# Patient Record
Sex: FEMALE | Race: WHITE | HISPANIC OR LATINO | URBAN - METROPOLITAN AREA
[De-identification: names, ages, dates, MRNs, and addresses within clinical notes are randomized per-mention and may not be internally consistent; named-entity substitution may affect disease eponyms.]

---

## 2017-10-21 ENCOUNTER — EMERGENCY (EMERGENCY)
Facility: HOSPITAL | Age: 22
LOS: 1 days | Discharge: HOME | End: 2017-10-21
Attending: EMERGENCY MEDICINE
Payer: MEDICAID

## 2017-10-21 VITALS
HEART RATE: 83 BPM | TEMPERATURE: 98 F | WEIGHT: 293 LBS | OXYGEN SATURATION: 100 % | SYSTOLIC BLOOD PRESSURE: 119 MMHG | RESPIRATION RATE: 18 BRPM | HEIGHT: 63 IN | DIASTOLIC BLOOD PRESSURE: 84 MMHG

## 2017-10-21 DIAGNOSIS — J40 BRONCHITIS, NOT SPECIFIED AS ACUTE OR CHRONIC: ICD-10-CM

## 2017-10-21 DIAGNOSIS — Z88.0 ALLERGY STATUS TO PENICILLIN: ICD-10-CM

## 2017-10-21 DIAGNOSIS — Z91.013 ALLERGY TO SEAFOOD: ICD-10-CM

## 2017-10-21 DIAGNOSIS — I10 ESSENTIAL (PRIMARY) HYPERTENSION: ICD-10-CM

## 2017-10-21 PROCEDURE — 99284 EMERGENCY DEPT VISIT MOD MDM: CPT

## 2017-10-21 PROCEDURE — 99283 EMERGENCY DEPT VISIT LOW MDM: CPT | Mod: 25

## 2017-10-21 PROCEDURE — 71020: CPT | Mod: 26

## 2017-10-21 PROCEDURE — 71046 X-RAY EXAM CHEST 2 VIEWS: CPT

## 2017-10-21 NOTE — ED ADULT NURSE NOTE - OBJECTIVE STATEMENT
Patient presents to ED with cough, congestion x 2 weeks. Denies fever, chills. Patient also reports right ear itchiness, dryness. Denies hearing loss.

## 2017-10-21 NOTE — ED PROVIDER NOTE - OBJECTIVE STATEMENT
23 y/o F pt w/ PMHx of HTN presents to the ED c/o cold symptoms. Pt reports after getting the flu shot she began to feel chest congestion and cough. Pt received 400 of levofloxacin from PMD to no relief. Denies fever, chills or any other complaints. Allergies: penicillin 23 y/o F pt w/ PMHx of HTN presents to the ED c/o cold symptoms. Pt reports after getting the flu shot she began to feel chest congestion and cough. Pt finished prescription of levofloxacin by PMD to no relief. Denies fever, chills or any other complaints. Allergies: penicillin

## 2017-11-11 ENCOUNTER — EMERGENCY (EMERGENCY)
Facility: HOSPITAL | Age: 22
LOS: 1 days | Discharge: HOME | End: 2017-11-11
Attending: EMERGENCY MEDICINE
Payer: MEDICAID

## 2017-11-11 VITALS
SYSTOLIC BLOOD PRESSURE: 135 MMHG | DIASTOLIC BLOOD PRESSURE: 85 MMHG | OXYGEN SATURATION: 97 % | HEART RATE: 96 BPM | HEIGHT: 63 IN | TEMPERATURE: 99 F | WEIGHT: 293 LBS | RESPIRATION RATE: 20 BRPM

## 2017-11-11 DIAGNOSIS — I10 ESSENTIAL (PRIMARY) HYPERTENSION: ICD-10-CM

## 2017-11-11 DIAGNOSIS — L02.413 CUTANEOUS ABSCESS OF RIGHT UPPER LIMB: ICD-10-CM

## 2017-11-11 DIAGNOSIS — Z88.0 ALLERGY STATUS TO PENICILLIN: ICD-10-CM

## 2017-11-11 DIAGNOSIS — Z91.013 ALLERGY TO SEAFOOD: ICD-10-CM

## 2017-11-11 PROCEDURE — 99284 EMERGENCY DEPT VISIT MOD MDM: CPT | Mod: 25

## 2017-11-11 RX ORDER — TETANUS TOXOID, REDUCED DIPHTHERIA TOXOID AND ACELLULAR PERTUSSIS VACCINE, ADSORBED 5; 2.5; 8; 8; 2.5 [IU]/.5ML; [IU]/.5ML; UG/.5ML; UG/.5ML; UG/.5ML
0.5 SUSPENSION INTRAMUSCULAR ONCE
Qty: 0 | Refills: 0 | Status: COMPLETED | OUTPATIENT
Start: 2017-11-11 | End: 2017-11-11

## 2017-11-11 RX ADMIN — TETANUS TOXOID, REDUCED DIPHTHERIA TOXOID AND ACELLULAR PERTUSSIS VACCINE, ADSORBED 0.5 MILLILITER(S): 5; 2.5; 8; 8; 2.5 SUSPENSION INTRAMUSCULAR at 23:32

## 2017-11-11 NOTE — ED PROVIDER NOTE - OBJECTIVE STATEMENT
21 y/o F pt with PMHx of PCOS (on Metformin), HTN (on Lisinopril and Metoprolol), and Irregular Menses and no significant PSHx presents to ED c/o abscess on R forearm with associated redness, warmth, pain, and discharge (yellow pus) for x1 week. Pt reports applying ice, as well as Neosporin and Hydrogen Peroxide to the area with no relief of sx's. Pt denies any other complaints. Pt also denies recent travel, recent hiking, Hx of smoking, EtOH use/abuse, or drug use/abuse. Pt notes FHx of cancer. Allergies: Penicillin (anaphylaxis, vomiting). Pharmacy: [facility]. 21 y/o F pt with PMHx of PCOS (on Metformin), HTN (on Lisinopril and Metoprolol), and Irregular Menses and no significant PSHx presents to ED c/o abscess on R forearm with associated redness, warmth, pain, and discharge (yellow pus) for x1 week. Pt reports applying ice, as well as Neosporin and Hydrogen Peroxide to the area with no relief of sx's. Pt denies any other complaints. Pt also denies fever, recent travel, recent hiking, Hx of smoking, EtOH use/abuse, or drug use/abuse. Pt notes FHx of cancer. Allergies: Penicillin (anaphylaxis, vomiting). Pharmacy: [facility].

## 2017-11-11 NOTE — ED PROVIDER NOTE - MEDICAL DECISION MAKING DETAILS
Diff dx includes abscess, cellulitis, Lyme disease. H/o drainage as well as mild induration c/w drained abscess. Bedside u/s showed no remaining anechoic fluid collection. Its heterogenous appearance that recalls erythema migrans is c/f Lyme disease as well, though no apparent exposure by history. Patient has no other systemic symptoms that would suggest disseminated Lyme. In summary, most likely drained abscess, but treated with 2 wks doxycycline due to possibility of Lyme. Discharged with instructions  for f/u.

## 2017-11-11 NOTE — ED PROVIDER NOTE - PHYSICAL EXAMINATION
Afebrile, hemodynamically stable  NAD, well appearing  Head NCAT  EOMI grossly  MMM  Breathing comfortably on RA  Obese  AAO, CN's 3-12 grossly intact  DONOHUE spontaneously  Skin warm, well perfused. Noted approximately 10x7cm erythematous patch with smaller concentric 5x5cm more erythematous patch with central superificial ulceration. No induration or fluctuance, no evident drainage

## 2017-11-12 VITALS
HEART RATE: 88 BPM | SYSTOLIC BLOOD PRESSURE: 130 MMHG | RESPIRATION RATE: 18 BRPM | TEMPERATURE: 98 F | DIASTOLIC BLOOD PRESSURE: 74 MMHG | OXYGEN SATURATION: 98 %

## 2017-11-12 PROCEDURE — 90471 IMMUNIZATION ADMIN: CPT

## 2017-11-12 PROCEDURE — 90715 TDAP VACCINE 7 YRS/> IM: CPT

## 2017-11-12 PROCEDURE — 86703 HIV-1/HIV-2 1 RESULT ANTBDY: CPT

## 2017-11-12 PROCEDURE — 99283 EMERGENCY DEPT VISIT LOW MDM: CPT | Mod: 25

## 2017-11-12 PROCEDURE — 87389 HIV-1 AG W/HIV-1&-2 AB AG IA: CPT

## 2017-11-12 RX ADMIN — Medication 100 MILLIGRAM(S): at 00:09

## 2017-12-09 ENCOUNTER — INPATIENT (INPATIENT)
Facility: HOSPITAL | Age: 22
LOS: 2 days | Discharge: HOME | DRG: 603 | End: 2017-12-12
Attending: INTERNAL MEDICINE | Admitting: INTERNAL MEDICINE
Payer: MEDICAID

## 2017-12-09 VITALS
DIASTOLIC BLOOD PRESSURE: 64 MMHG | TEMPERATURE: 99 F | OXYGEN SATURATION: 97 % | RESPIRATION RATE: 19 BRPM | HEART RATE: 110 BPM | WEIGHT: 293 LBS | SYSTOLIC BLOOD PRESSURE: 122 MMHG | HEIGHT: 63 IN

## 2017-12-09 DIAGNOSIS — E28.2 POLYCYSTIC OVARIAN SYNDROME: ICD-10-CM

## 2017-12-09 DIAGNOSIS — L03.211 CELLULITIS OF FACE: ICD-10-CM

## 2017-12-09 DIAGNOSIS — L03.90 CELLULITIS, UNSPECIFIED: ICD-10-CM

## 2017-12-09 DIAGNOSIS — I10 ESSENTIAL (PRIMARY) HYPERTENSION: ICD-10-CM

## 2017-12-09 DIAGNOSIS — E66.9 OBESITY, UNSPECIFIED: ICD-10-CM

## 2017-12-09 DIAGNOSIS — Z29.9 ENCOUNTER FOR PROPHYLACTIC MEASURES, UNSPECIFIED: ICD-10-CM

## 2017-12-09 LAB
ALBUMIN SERPL ELPH-MCNC: 3.1 G/DL — LOW (ref 3.5–5)
ALP SERPL-CCNC: 102 U/L — SIGNIFICANT CHANGE UP (ref 40–120)
ALT FLD-CCNC: 41 U/L DA — SIGNIFICANT CHANGE UP (ref 10–60)
ANION GAP SERPL CALC-SCNC: 8 MMOL/L — SIGNIFICANT CHANGE UP (ref 5–17)
AST SERPL-CCNC: 20 U/L — SIGNIFICANT CHANGE UP (ref 10–40)
BILIRUB SERPL-MCNC: 0.6 MG/DL — SIGNIFICANT CHANGE UP (ref 0.2–1.2)
BUN SERPL-MCNC: 8 MG/DL — SIGNIFICANT CHANGE UP (ref 7–18)
CALCIUM SERPL-MCNC: 8.9 MG/DL — SIGNIFICANT CHANGE UP (ref 8.4–10.5)
CHLORIDE SERPL-SCNC: 102 MMOL/L — SIGNIFICANT CHANGE UP (ref 96–108)
CO2 SERPL-SCNC: 27 MMOL/L — SIGNIFICANT CHANGE UP (ref 22–31)
CREAT SERPL-MCNC: 0.7 MG/DL — SIGNIFICANT CHANGE UP (ref 0.5–1.3)
GLUCOSE SERPL-MCNC: 111 MG/DL — HIGH (ref 70–99)
HCT VFR BLD CALC: 41.8 % — SIGNIFICANT CHANGE UP (ref 34.5–45)
HGB BLD-MCNC: 14 G/DL — SIGNIFICANT CHANGE UP (ref 11.5–15.5)
LACTATE SERPL-SCNC: 1.6 MMOL/L — SIGNIFICANT CHANGE UP (ref 0.7–2)
MCHC RBC-ENTMCNC: 29.6 PG — SIGNIFICANT CHANGE UP (ref 27–34)
MCHC RBC-ENTMCNC: 33.6 GM/DL — SIGNIFICANT CHANGE UP (ref 32–36)
MCV RBC AUTO: 88.2 FL — SIGNIFICANT CHANGE UP (ref 80–100)
PLATELET # BLD AUTO: 240 K/UL — SIGNIFICANT CHANGE UP (ref 150–400)
POTASSIUM SERPL-MCNC: 3.9 MMOL/L — SIGNIFICANT CHANGE UP (ref 3.5–5.3)
POTASSIUM SERPL-SCNC: 3.9 MMOL/L — SIGNIFICANT CHANGE UP (ref 3.5–5.3)
PROT SERPL-MCNC: 8.5 G/DL — HIGH (ref 6–8.3)
RBC # BLD: 4.74 M/UL — SIGNIFICANT CHANGE UP (ref 3.8–5.2)
RBC # FLD: 12 % — SIGNIFICANT CHANGE UP (ref 10.3–14.5)
SODIUM SERPL-SCNC: 137 MMOL/L — SIGNIFICANT CHANGE UP (ref 135–145)
WBC # BLD: 13.9 K/UL — HIGH (ref 3.8–10.5)
WBC # FLD AUTO: 13.9 K/UL — HIGH (ref 3.8–10.5)

## 2017-12-09 PROCEDURE — 99284 EMERGENCY DEPT VISIT MOD MDM: CPT

## 2017-12-09 PROCEDURE — 70491 CT SOFT TISSUE NECK W/DYE: CPT | Mod: 26

## 2017-12-09 RX ORDER — LISINOPRIL 2.5 MG/1
10 TABLET ORAL DAILY
Qty: 0 | Refills: 0 | Status: DISCONTINUED | OUTPATIENT
Start: 2017-12-09 | End: 2017-12-12

## 2017-12-09 RX ORDER — LACTOBACILLUS ACIDOPHILUS 100MM CELL
1 CAPSULE ORAL
Qty: 0 | Refills: 0 | Status: DISCONTINUED | OUTPATIENT
Start: 2017-12-09 | End: 2017-12-12

## 2017-12-09 RX ORDER — METFORMIN HYDROCHLORIDE 850 MG/1
500 TABLET ORAL DAILY
Qty: 0 | Refills: 0 | Status: DISCONTINUED | OUTPATIENT
Start: 2017-12-09 | End: 2017-12-12

## 2017-12-09 RX ORDER — IBUPROFEN 200 MG
400 TABLET ORAL EVERY 6 HOURS
Qty: 0 | Refills: 0 | Status: DISCONTINUED | OUTPATIENT
Start: 2017-12-09 | End: 2017-12-12

## 2017-12-09 RX ORDER — KETOROLAC TROMETHAMINE 30 MG/ML
30 SYRINGE (ML) INJECTION ONCE
Qty: 0 | Refills: 0 | Status: DISCONTINUED | OUTPATIENT
Start: 2017-12-09 | End: 2017-12-09

## 2017-12-09 RX ORDER — MEDROXYPROGESTERONE ACETATE 150 MG/ML
10 INJECTION, SUSPENSION, EXTENDED RELEASE INTRAMUSCULAR DAILY
Qty: 0 | Refills: 0 | Status: DISCONTINUED | OUTPATIENT
Start: 2017-12-09 | End: 2017-12-12

## 2017-12-09 RX ORDER — METOPROLOL TARTRATE 50 MG
50 TABLET ORAL DAILY
Qty: 0 | Refills: 0 | Status: DISCONTINUED | OUTPATIENT
Start: 2017-12-09 | End: 2017-12-12

## 2017-12-09 RX ORDER — VANCOMYCIN HCL 1 G
1000 VIAL (EA) INTRAVENOUS EVERY 12 HOURS
Qty: 0 | Refills: 0 | Status: DISCONTINUED | OUTPATIENT
Start: 2017-12-09 | End: 2017-12-11

## 2017-12-09 RX ADMIN — METFORMIN HYDROCHLORIDE 500 MILLIGRAM(S): 850 TABLET ORAL at 21:57

## 2017-12-09 RX ADMIN — Medication 1 TABLET(S): at 18:36

## 2017-12-09 RX ADMIN — Medication 250 MILLIGRAM(S): at 18:35

## 2017-12-09 RX ADMIN — MEDROXYPROGESTERONE ACETATE 10 MILLIGRAM(S): 150 INJECTION, SUSPENSION, EXTENDED RELEASE INTRAMUSCULAR at 18:36

## 2017-12-09 RX ADMIN — LISINOPRIL 10 MILLIGRAM(S): 2.5 TABLET ORAL at 21:58

## 2017-12-09 RX ADMIN — Medication 100 MILLIGRAM(S): at 12:07

## 2017-12-09 RX ADMIN — Medication 50 MILLIGRAM(S): at 18:36

## 2017-12-09 RX ADMIN — Medication 100 MILLIGRAM(S): at 21:57

## 2017-12-09 RX ADMIN — Medication 30 MILLIGRAM(S): at 12:30

## 2017-12-09 RX ADMIN — Medication 30 MILLIGRAM(S): at 12:06

## 2017-12-09 NOTE — H&P ADULT - MUSCULOSKELETAL
detailed exam no joint warmth/normal strength/no joint swelling/no joint erythema/normal/ROM intact/no calf tenderness

## 2017-12-09 NOTE — H&P ADULT - NSHPLABSRESULTS_GEN_ALL_CORE
14.0   13.9  )-----------( 240      ( 09 Dec 2017 11:55 )             41.8   12-09    137  |  102  |  8   ----------------------------<  111<H>  3.9   |  27  |  0.70    Ca    8.9      09 Dec 2017 11:55    TPro  8.5<H>  /  Alb  3.1<L>  /  TBili  0.6  /  DBili  x   /  AST  20  /  ALT  41  /  AlkPhos  102  12-09  < from: CT Neck Soft Tissue w/ IV Cont (12.09.17 @ 12:04) >      Vital Signs Last 24 Hrs  T(C): 36.8 (09 Dec 2017 13:47), Max: 37.3 (09 Dec 2017 10:28)  T(F): 98.3 (09 Dec 2017 13:47), Max: 99.2 (09 Dec 2017 10:28)  HR: 103 (09 Dec 2017 13:47) (103 - 110)  BP: 114/69 (09 Dec 2017 13:47) (114/69 - 122/64)  BP(mean): --  RR: 18 (09 Dec 2017 13:47) (18 - 19)  SpO2: 100% (09 Dec 2017 13:47) (97% - 100%)

## 2017-12-09 NOTE — CONSULT NOTE ADULT - ASSESSMENT
23 y/o F, morbidly obese, PMHx of HTN, obesity, PCOS, vitamin D deficiency,  presents to ED c/o right facial pain. Patient reports she had a pimple for 10 days which she squeezed on her bottom right cheek.  Patient was admitted for facial cellulitis, and was started on IV Clindamycin.  Vancomycin was added.

## 2017-12-09 NOTE — H&P ADULT - HISTORY OF PRESENT ILLNESS
21 y/o F, morbidly obese, PMHx of HTN, obesity, PCOS, vitamin D deficiency,  presents to ED c/o right facial pain. Patient reports she had a pimple for 10 days which she squeezed on her bottom right cheek. Patient states since squeezing it, the pimple appears to be more swollen and red. Patient denies fever, chills, nausea, vomiting, diarrhea, abd pain, discharge, recent travel or any other complaints.   Allergies: penicillin: Drug, Vomiting, shellfish: Food, Swelling (Mild)  FhX: Hx of HTN, DM, lupus, thyroid in father, from mother side has colon CA, breast CA.  SHx: No surgeries in past  In the ED initial vitals with mild tachycardiac, afebrile, labs with mild leukocytosis, to 13.9, got CT neck with soft tissue that shows right facial cellulitis got clindamycin, Toradol ED physician spoke to the plastic surgeon, no fluid collection see,  admitted for IV antibiotics

## 2017-12-09 NOTE — H&P ADULT - ATTENDING COMMENTS
Above  noted  21 y/o F, morbidly obese, PMHx of HTN, obesity, PCOS, vitamin D deficiency,  presents to ED c/o right facial pain. Patient reports she had a pimple for 10 days which she squeezed on her bottom right cheek. Patient states since squeezing it, the pimple appears to be more swollen and red.  Blood  tests radiology report reviewed    Impression Cellulitis  face  Sepsis syndrome    HTN Morbid Obesity   PCOS Vitamin D deficiency   ID evaluation Septic work up agree with IV antibiotics  GI DVT prophylaxis  Nutritional support,Patient  is schedule  for bariatric  surgery  in the  near future

## 2017-12-09 NOTE — CONSULT NOTE ADULT - SUBJECTIVE AND OBJECTIVE BOX
HPI:  21 y/o F, morbidly obese, PMHx of HTN, obesity, PCOS, vitamin D deficiency,  presents to ED c/o right facial pain. Patient reports she had a pimple for 10 days which she squeezed on her bottom right cheek. Patient states since squeezing it, the pimple appears to be more swollen and red. Patient denies fever, chills, nausea, vomiting, diarrhea, abd pain, discharge, recent travel or any other complaints.   Allergies: penicillin: Drug, Vomiting, shellfish: Food, Swelling (Mild)  FhX: Hx of HTN, DM, lupus, thyroid in father, from mother side has colon CA, breast CA.  SHx: No surgeries in past  In the ED initial vitals with mild tachycardiac, afebrile, labs with mild leukocytosis, to 13.9, got CT neck with soft tissue that shows right facial cellulitis got clindamycin, Toradol ED physician spoke to the plastic surgeon, no fluid collection see,  admitted for IV antibiotics (09 Dec 2017 13:42)      PAST MEDICAL & SURGICAL HISTORY:  PCOS (polycystic ovarian syndrome)  Irregular menses  HTN (hypertension)  No significant past surgical history      penicillin (Vomiting)  shellfish (Swelling (Mild))      clindamycin IVPB 300 milliGRAM(s) IV Intermittent every 8 hours  ibuprofen  Tablet 400 milliGRAM(s) Oral every 6 hours PRN  lactobacillus acidophilus 1 Tablet(s) Oral three times a day with meals  lisinopril 10 milliGRAM(s) Oral daily  medroxyPROGESTERone 10 milliGRAM(s) Oral daily  metFORMIN 500 milliGRAM(s) Oral daily  metoprolol     tartrate 50 milliGRAM(s) Oral daily  vancomycin  IVPB 1000 milliGRAM(s) IV Intermittent every 12 hours      Social Hx:    FAMILY HISTORY:        ROS  [  ] UNABLE TO ELICIT    General:  [  ] None  [  ] Fever  [  ] Chills  [ x ] Malaise    Skin:  [  ] None [  ] Rash  [  ] Wound  [ x ] Right side facial redness and swelling.    HEENT:  [  ] None  [  ] Sore Throat  [  ] Nasal congestion/ runny nose  [  ] Photophobia [  ] Neck pain                 [ x ] Right side facial redness and swelling.    Chest:  [ x ] None   [  ] SOB  [  ] Cough  [  ] None    Cardiovascular:   [ x ] None  [  ] CP  [  ] Palpitation    Gastrointestinal:  [ x ] None  [  ] Abd pain   [  ] Nausea    [  ] Vomiting   [  ] Diarrhea	     Genitourinary:  [ x ] None [  ] Polyuria   [  ] Urgency  [  ] Frequency  [  ] Dysuria    [  ]  Hematuria       Musculoskeletal:  [ x ] None [  ] Back Pain	[  ] Body aches  [  ] Joint pain    Neurological: [ x ] None [  ]Dizziness  [  ]Visual Disturbance  [  ]Headaches   [  ] Weakness      PHYSICAL EXAM:    Vital Signs Last 24 Hrs  T(C): 36.7 (09 Dec 2017 15:45), Max: 37.3 (09 Dec 2017 10:28)  T(F): 98 (09 Dec 2017 15:45), Max: 99.2 (09 Dec 2017 10:28)  HR: 111 (09 Dec 2017 15:45) (103 - 111)  BP: 147/76 (09 Dec 2017 15:45) (114/69 - 147/76)  BP(mean): --  RR: 17 (09 Dec 2017 15:45) (17 - 19)  SpO2: 100% (09 Dec 2017 15:45) (97% - 100%)    Constitutional:    HEENT: [  ] Wnl  [  ] Pharyngeal congestion [ x ] Right side facial, neck redness, swelling, and induration.    Neck:  [ x ] Supple  [  ]Lymphadenopathy  [ x ] No JVD   [  ] JVD  [  ] Masses   [  ] WNL    CHEST/Respiratory:  [ x ]Clear to auscultation  [  ] Rales   [  ] Rhonchi   [  ] Wheezing     [  ] Chest Tenderness      Cardiovascular:  [ x ] Reg S1 S2   [  ] Irreg S1 S2   [  ]No Murmur  [  ] +ve Murmurs  [  ]Systolic [  ]Diastolic      Abdomen:  [ x ] Soft  [ x ] No tendrerness  [  ] Tenderness  [  ] Organomegaly  [  ] ABD Distention  [  ] Rigidity                       [ x ] No Regidity                       [ x ] No Rebound Tenderness  [  ] No Guarding Rigidity  [  ] Rebound Tenderness[  ] Guarding Rigidity                          [ x ]  +ve Bowel Sounds  [  ] Decreased Bowel Sounds    [  ] Absent Bowel Sounds                            Extremities: [ x ] No edema [  ] Edema  [  ] Clubbing   [  ] Cyanosis                         [ x ] No Tender Calf muscles  [  ] Tender Calf muscles                        [ x ] Palpable peripheral pulses    Neurological: [ x ] Awake  [ x ] Alert  [ x ] Oriented  x  3                           [  ] Confused  [  ] Drowzy  [  ] repond to painful stimuli  [  ] Unresponsive    Skin:  [  ] Intact [  ] Redness [  ] Thrombophlebitis  [  ] Rashes  [  ] Dry  [  ] Ulcers          [ x ] Right side facial,  and neck redness and swelling.    Ortho:  [  ] Joint Swelling  [  ] Joint erythema [  ] Joint tenderness                [  ] Increased temp. to touch  [  ] DJD [ x ] WNL      LABS/DIAGNOSTIC TESTS                          14.0   13.9  )-----------( 240      ( 09 Dec 2017 11:55 )             41.8     WBC Count: 13.9 K/uL (12-09 @ 11:55)      12-09    137  |  102  |  8   ----------------------------<  111<H>  3.9   |  27  |  0.70    Ca    8.9      09 Dec 2017 11:55    TPro  8.5<H>  /  Alb  3.1<L>  /  TBili  0.6  /  DBili  x   /  AST  20  /  ALT  41  /  AlkPhos  102  12-09          LIVER FUNCTIONS - ( 09 Dec 2017 11:55 )  Alb: 3.1 g/dL / Pro: 8.5 g/dL / ALK PHOS: 102 U/L / ALT: 41 U/L DA / AST: 20 U/L / GGT: x                 LACTATE:Lactate, Blood: 1.6 mmol/L (12-09 @ 11:55)        CULTURES:   Pending.    RADIOLOGY      EXAM:  CT NECK SOFT TISSUE IC                            PROCEDURE DATE:  12/09/2017          INTERPRETATION:  CLINICAL INDICATION:  Neck pain. Evaluate for abscess.    TECHNIQUE:   Axial images were obtained following the intravenous   administration of contrast material. Sagittal and coronal reformatted   images were obtained. 90 cc Omnipaque 350 were administered. 10 cc were   discarded.    COMPARISON EXAMINATION:  None.    FINDINGS:  AERODIGESTIVE TRACT:  Normal.    LYMPH NODES: Subcentimeter lymph nodes are seen in the right   submandibular region and submental region.    PAROTID GLANDS:  Fatty infiltration of both parotid glands.    SUBMANDIBULAR GLANDS:  Normal.    THYROID GLAND:  Normal.    VISUALIZED PARANASAL SINUSES: Mild paranasal sinus mucosal thickening   involving the right maxillary and bilateral sphenoid and ethmoid sinuses.    VISUALIZED TYMPANOMASTOID CAVITIES: Clear.    BONES:  Normal.    MISCELLANEOUS:  There is skin thickening of the right face at the level   of the angle of the right mandible. Subjacent fluid and fat reticulation   is seen. No walled drainable fluid collection is seen. There is reactive   thickening of the right platysma muscle.      IMPRESSION:    Right facial cellulitis. No drainable fluid collection.

## 2017-12-09 NOTE — H&P ADULT - RS GEN PE MLT RESP DETAILS PC
respirations non-labored/airway patent/good air movement/clear to auscultation bilaterally/no rhonchi/normal/breath sounds equal/no chest wall tenderness/no intercostal retractions/no wheezes/no rales/no subcutaneous emphysema

## 2017-12-09 NOTE — H&P ADULT - GASTROINTESTINAL DETAILS
no distention/soft/nontender/no bruit/no rigidity/no organomegaly/no guarding/no masses palpable/bowel sounds normal/normal/no rebound tenderness

## 2017-12-09 NOTE — H&P ADULT - PROBLEM SELECTOR PLAN 1
Pimple on right cheek for 10 days that breaks, associated with mild pain  Afebrile, mild wbc count   CT neck shows right facial cellulitis, with no drainable abscess,  subcentimeter lymph nodes are seen in the right  submandibular region and submental region.  Fatty infiltration of both parotid glands. Skin thickening of the right face at the level of the angle of the right mandible. Subjacent fluid and fat reticulation is seen. There is reactive thickening of the right platysma muscle.  Got clindamycin and Toradol  Will continue with clindamycin for now, pain control  ID consult Dr Palmer Pimple on right cheek for 10 days that breaks, associated with mild pain  Afebrile, mild wbc count , normal lactate   CT neck shows right facial cellulitis, with no drainable abscess,  subcentimeter lymph nodes are seen in the right  submandibular region and submental region.  Fatty infiltration of both parotid glands. Skin thickening of the right face at the level of the angle of the right mandible. Subjacent fluid and fat reticulation is seen. There is reactive thickening of the right platysma muscle.  Got clindamycin and Toradol  Will continue with clindamycin for now, pain control  ID consult Dr Palmer

## 2017-12-09 NOTE — ED PROVIDER NOTE - OBJECTIVE STATEMENT
23 y/o F pt with PMHx of HTN, obesity, presents to ED c/o abscess. Pt reports she had a pimple for 10 days which she squeezed on her bottom right cheek. Pt states since squeezing it, the pimple appears to be more swollen and red. Pt denies fever, chills, nausea, vomiting, diarrhea, abd pain, discharge, or any other complaints. NKDA.

## 2017-12-09 NOTE — CONSULT NOTE ADULT - PROBLEM SELECTOR RECOMMENDATION 9
1- Blood cultures.  2- Vancomycin 1 gm IVPB q 12 hours.  3- Continue Clindamycin 600 mg IVPB q 8 hours.  4- Surgical evaluation.

## 2017-12-09 NOTE — H&P ADULT - ADDITIONAL PE
Fluctuance and induration from the angle of the middle of the mandible to mid neck, right facial side is erythematous , swollen, mildly tense

## 2017-12-10 LAB
ALBUMIN SERPL ELPH-MCNC: 3 G/DL — LOW (ref 3.5–5)
ALP SERPL-CCNC: 89 U/L — SIGNIFICANT CHANGE UP (ref 40–120)
ALT FLD-CCNC: 38 U/L DA — SIGNIFICANT CHANGE UP (ref 10–60)
ANION GAP SERPL CALC-SCNC: 9 MMOL/L — SIGNIFICANT CHANGE UP (ref 5–17)
AST SERPL-CCNC: 19 U/L — SIGNIFICANT CHANGE UP (ref 10–40)
BILIRUB SERPL-MCNC: 0.6 MG/DL — SIGNIFICANT CHANGE UP (ref 0.2–1.2)
BUN SERPL-MCNC: 10 MG/DL — SIGNIFICANT CHANGE UP (ref 7–18)
CALCIUM SERPL-MCNC: 8.6 MG/DL — SIGNIFICANT CHANGE UP (ref 8.4–10.5)
CHLORIDE SERPL-SCNC: 102 MMOL/L — SIGNIFICANT CHANGE UP (ref 96–108)
CHOLEST SERPL-MCNC: 183 MG/DL — SIGNIFICANT CHANGE UP (ref 10–199)
CO2 SERPL-SCNC: 26 MMOL/L — SIGNIFICANT CHANGE UP (ref 22–31)
CREAT SERPL-MCNC: 0.65 MG/DL — SIGNIFICANT CHANGE UP (ref 0.5–1.3)
FOLATE SERPL-MCNC: 6.2 NG/ML — SIGNIFICANT CHANGE UP (ref 4.8–24.2)
GLUCOSE SERPL-MCNC: 100 MG/DL — HIGH (ref 70–99)
HBA1C BLD-MCNC: 5.4 % — SIGNIFICANT CHANGE UP (ref 4–5.6)
HCT VFR BLD CALC: 37.9 % — SIGNIFICANT CHANGE UP (ref 34.5–45)
HDLC SERPL-MCNC: 52 MG/DL — SIGNIFICANT CHANGE UP (ref 40–125)
HGB BLD-MCNC: 12.6 G/DL — SIGNIFICANT CHANGE UP (ref 11.5–15.5)
INR BLD: 1.13 RATIO — SIGNIFICANT CHANGE UP (ref 0.88–1.16)
LIPID PNL WITH DIRECT LDL SERPL: 113 MG/DL — SIGNIFICANT CHANGE UP
MAGNESIUM SERPL-MCNC: 2.2 MG/DL — SIGNIFICANT CHANGE UP (ref 1.6–2.6)
MCHC RBC-ENTMCNC: 29.7 PG — SIGNIFICANT CHANGE UP (ref 27–34)
MCHC RBC-ENTMCNC: 33.1 GM/DL — SIGNIFICANT CHANGE UP (ref 32–36)
MCV RBC AUTO: 89.8 FL — SIGNIFICANT CHANGE UP (ref 80–100)
PHOSPHATE SERPL-MCNC: 3.6 MG/DL — SIGNIFICANT CHANGE UP (ref 2.5–4.5)
PLATELET # BLD AUTO: 238 K/UL — SIGNIFICANT CHANGE UP (ref 150–400)
POTASSIUM SERPL-MCNC: 4.1 MMOL/L — SIGNIFICANT CHANGE UP (ref 3.5–5.3)
POTASSIUM SERPL-SCNC: 4.1 MMOL/L — SIGNIFICANT CHANGE UP (ref 3.5–5.3)
PROT SERPL-MCNC: 7.7 G/DL — SIGNIFICANT CHANGE UP (ref 6–8.3)
PROTHROM AB SERPL-ACNC: 12.3 SEC — SIGNIFICANT CHANGE UP (ref 9.8–12.7)
RBC # BLD: 4.22 M/UL — SIGNIFICANT CHANGE UP (ref 3.8–5.2)
RBC # FLD: 12.2 % — SIGNIFICANT CHANGE UP (ref 10.3–14.5)
SODIUM SERPL-SCNC: 137 MMOL/L — SIGNIFICANT CHANGE UP (ref 135–145)
TOTAL CHOLESTEROL/HDL RATIO MEASUREMENT: 3.5 RATIO — SIGNIFICANT CHANGE UP (ref 3.3–7.1)
TRIGL SERPL-MCNC: 91 MG/DL — SIGNIFICANT CHANGE UP (ref 10–149)
TSH SERPL-MCNC: 8.61 UU/ML — HIGH (ref 0.34–4.82)
VIT B12 SERPL-MCNC: 261 PG/ML — SIGNIFICANT CHANGE UP (ref 243–894)
WBC # BLD: 11.8 K/UL — HIGH (ref 3.8–10.5)
WBC # FLD AUTO: 11.8 K/UL — HIGH (ref 3.8–10.5)

## 2017-12-10 RX ADMIN — Medication 250 MILLIGRAM(S): at 06:36

## 2017-12-10 RX ADMIN — Medication 400 MILLIGRAM(S): at 02:10

## 2017-12-10 RX ADMIN — Medication 100 MILLIGRAM(S): at 12:46

## 2017-12-10 RX ADMIN — Medication 400 MILLIGRAM(S): at 02:45

## 2017-12-10 RX ADMIN — Medication 250 MILLIGRAM(S): at 17:21

## 2017-12-10 RX ADMIN — Medication 50 MILLIGRAM(S): at 06:36

## 2017-12-10 RX ADMIN — Medication 1 TABLET(S): at 12:46

## 2017-12-10 RX ADMIN — METFORMIN HYDROCHLORIDE 500 MILLIGRAM(S): 850 TABLET ORAL at 12:46

## 2017-12-10 RX ADMIN — Medication 1 TABLET(S): at 17:21

## 2017-12-10 RX ADMIN — MEDROXYPROGESTERONE ACETATE 10 MILLIGRAM(S): 150 INJECTION, SUSPENSION, EXTENDED RELEASE INTRAMUSCULAR at 12:46

## 2017-12-10 RX ADMIN — Medication 1 TABLET(S): at 08:18

## 2017-12-10 RX ADMIN — Medication 100 MILLIGRAM(S): at 21:25

## 2017-12-10 RX ADMIN — LISINOPRIL 10 MILLIGRAM(S): 2.5 TABLET ORAL at 06:36

## 2017-12-10 RX ADMIN — Medication 100 MILLIGRAM(S): at 06:37

## 2017-12-10 NOTE — PROGRESS NOTE ADULT - SUBJECTIVE AND OBJECTIVE BOX
Meds:  clindamycin IVPB 600 milliGRAM(s) IV Intermittent every 8 hours  vancomycin  IVPB 1000 milliGRAM(s) IV Intermittent every 12 hours    Allergies:  Allergies    penicillin (Vomiting)  shellfish (Swelling (Mild))    Intolerances    ROS  [  ] UNABLE TO ELICIT    General:  [  ] None  [  ] Fever  [  ] Chills  [ x ] Malaise    Skin:  [  ] None [  ] Rash  [  ] Wound  [ x ] Right side facial redness and swelling.    HEENT:  [  ] None  [  ] Sore Throat  [  ] Nasal congestion/ runny nose  [  ] Photophobia [  ] Neck pain                 [ x ] Right side facial, and neck redness and swelling.    Chest:  [ x ] None   [  ] SOB  [  ] Cough  [  ] None    Cardiovascular:   [ x ] None  [  ] CP  [  ] Palpitation    Gastrointestinal:  [ x ] None  [  ] Abd pain   [  ] Nausea    [  ] Vomiting   [  ] Diarrhea	     Genitourinary:  [ x ] None [  ] Polyuria   [  ] Urgency  [  ] Frequency  [  ] Dysuria    [  ]  Hematuria       Musculoskeletal:  [ x ] None [  ] Back Pain	[  ] Body aches  [  ] Joint pain    Neurological: [ x ] None [  ]Dizziness  [  ]Visual Disturbance  [  ]Headaches   [  ] Weakness          PHYSICAL EXAM:    Vital Signs Last 24 Hrs  T(C): 37.1 (09 Dec 2017 21:29), Max: 37.3 (09 Dec 2017 10:28)  T(F): 98.8 (09 Dec 2017 21:29), Max: 99.2 (09 Dec 2017 10:28)  HR: 96 (09 Dec 2017 21:29) (96 - 111)  BP: 132/69 (09 Dec 2017 21:29) (114/69 - 147/76)  BP(mean): --  RR: 17 (09 Dec 2017 21:29) (17 - 19)  SpO2: 100% (09 Dec 2017 21:29) (97% - 100%)    Constitutional:    HEENT: [  ] Wnl  [  ] Pharyngeal congestion [ x ] Right side facial, neck redness, swelling, and induration.    Neck:  [ x ] Supple  [  ]Lymphadenopathy  [ x ] No JVD   [  ] JVD  [  ] Masses   [  ] WNL    CHEST/Respiratory:  [ x ]Clear to auscultation  [  ] Rales   [  ] Rhonchi   [  ] Wheezing     [  ] Chest Tenderness      Cardiovascular:  [ x ] Reg S1 S2   [  ] Irreg S1 S2   [  ]No Murmur  [  ] +ve Murmurs  [  ]Systolic [  ]Diastolic      Abdomen:  [ x ] Soft  [ x ] No tendrerness  [  ] Tenderness  [  ] Organomegaly  [  ] ABD Distention  [  ] Rigidity                       [ x ] No Regidity                       [ x ] No Rebound Tenderness  [  ] No Guarding Rigidity  [  ] Rebound Tenderness[  ] Guarding Rigidity                          [ x ]  +ve Bowel Sounds  [  ] Decreased Bowel Sounds    [  ] Absent Bowel Sounds                            Extremities: [ x ] No edema [  ] Edema  [  ] Clubbing   [  ] Cyanosis                         [ x ] No Tender Calf muscles  [  ] Tender Calf muscles                        [ x ] Palpable peripheral pulses    Neurological: [ x ] Awake  [ x ] Alert  [ x ] Oriented  x  3                           [  ] Confused  [  ] Drowzy  [  ] repond to painful stimuli  [  ] Unresponsive    Skin:  [  ] Intact [  ] Redness [  ] Thrombophlebitis  [  ] Rashes  [  ] Dry  [  ] Ulcers          [ x ] Right side facial,  and neck redness and swelling.    Ortho:  [  ] Joint Swelling  [  ] Joint erythema [  ] Joint tenderness                [  ] Increased temp. to touch  [  ] DJD [ x ] WNL        LABS/DIAGNOSTIC TESTS                          14.0   13.9  )-----------( 240      ( 09 Dec 2017 11:55 )             41.8     Lactate, Blood: 1.6 mmol/L (12-09 @ 11:55)      12-09    137  |  102  |  8   ----------------------------<  111<H>  3.9   |  27  |  0.70    Ca    8.9      09 Dec 2017 11:55    TPro  8.5<H>  /  Alb  3.1<L>  /  TBili  0.6  /  DBili  x   /  AST  20  /  ALT  41  /  AlkPhos  102  12-09      LIVER FUNCTIONS - ( 09 Dec 2017 11:55 )  Alb: 3.1 g/dL / Pro: 8.5 g/dL / ALK PHOS: 102 U/L / ALT: 41 U/L DA / AST: 20 U/L / GGT: x           HIV-1/2 Antigen/Antibody Screen by [facility] (11.12.17 @ 12:53)    HIV-1/2 Combo Result: Nonreact: The HIV Ag/Ab Combo test performed screens for HIV-1 p24 antigen,  Rapid HIV-1/2 Antibody (11.12.17 @ 00:27)    Rapid HIV-1/2 Antibody: Nonreact: The sensitivity and specificity of the assay are >99%.        CULTURES:   Pending.    RADIOLOGY      EXAM:  CT NECK SOFT TISSUE IC                            PROCEDURE DATE:  12/09/2017          INTERPRETATION:  CLINICAL INDICATION:  Neck pain. Evaluate for abscess.    TECHNIQUE:   Axial images were obtained following the intravenous   administration of contrast material. Sagittal and coronal reformatted   images were obtained. 90 cc Omnipaque 350 were administered. 10 cc were   discarded.    COMPARISON EXAMINATION:  None.    FINDINGS:  AERODIGESTIVE TRACT:  Normal.    LYMPH NODES: Subcentimeter lymph nodes are seen in the right   submandibular region and submental region.    PAROTID GLANDS:  Fatty infiltration of both parotid glands.    SUBMANDIBULAR GLANDS:  Normal.    THYROID GLAND:  Normal.    VISUALIZED PARANASAL SINUSES: Mild paranasal sinus mucosal thickening   involving the right maxillary and bilateral sphenoid and ethmoid sinuses.    VISUALIZED TYMPANOMASTOID CAVITIES: Clear.    BONES:  Normal.    MISCELLANEOUS:  There is skin thickening of the right face at the level   of the angle of the right mandible. Subjacent fluid and fat reticulation   is seen. No walled drainable fluid collection is seen. There is reactive   thickening of the right platysma muscle.      IMPRESSION:    Right facial cellulitis. No drainable fluid collection.        Assessment and Recommendation:   23 y/o F, morbidly obese, PMHx of HTN, obesity, PCOS, vitamin D deficiency,  presents to ED c/o right facial pain. Patient reports she had a pimple for 10 days which she squeezed on her bottom right cheek.  Patient was admitted for facial cellulitis, and was started on IV Clindamycin.  Vancomycin was added, and the Clindamycin dose was adjusted.    Problem/Recommendation - 1:  Problem: Facial cellulitis.   Recommendation:   1- Blood cultures.  2- Continue Vancomycin 1 gm IVPB q 12 hours.  3- Continue Clindamycin 600 mg IVPB q 8 hours.  4- Surgical evaluation, and follow up.    Problem/Recommendation - 2:  ·  Problem: HTN (hypertension).    Recommendation:   1- Monitor Blood pressure closely.  2- Blood pressure control.  3- BP. meds as per cardiology and primary care team.     Discussed with medical resident.

## 2017-12-11 LAB
24R-OH-CALCIDIOL SERPL-MCNC: 12.3 NG/ML — LOW (ref 30–80)
ANION GAP SERPL CALC-SCNC: 6 MMOL/L — SIGNIFICANT CHANGE UP (ref 5–17)
BUN SERPL-MCNC: 12 MG/DL — SIGNIFICANT CHANGE UP (ref 7–18)
CALCIUM SERPL-MCNC: 8.8 MG/DL — SIGNIFICANT CHANGE UP (ref 8.4–10.5)
CHLORIDE SERPL-SCNC: 105 MMOL/L — SIGNIFICANT CHANGE UP (ref 96–108)
CO2 SERPL-SCNC: 26 MMOL/L — SIGNIFICANT CHANGE UP (ref 22–31)
CREAT SERPL-MCNC: 0.72 MG/DL — SIGNIFICANT CHANGE UP (ref 0.5–1.3)
GLUCOSE SERPL-MCNC: 103 MG/DL — HIGH (ref 70–99)
HCT VFR BLD CALC: 38.6 % — SIGNIFICANT CHANGE UP (ref 34.5–45)
HGB BLD-MCNC: 12.8 G/DL — SIGNIFICANT CHANGE UP (ref 11.5–15.5)
MCHC RBC-ENTMCNC: 29.2 PG — SIGNIFICANT CHANGE UP (ref 27–34)
MCHC RBC-ENTMCNC: 33.1 GM/DL — SIGNIFICANT CHANGE UP (ref 32–36)
MCV RBC AUTO: 88.2 FL — SIGNIFICANT CHANGE UP (ref 80–100)
PLATELET # BLD AUTO: 261 K/UL — SIGNIFICANT CHANGE UP (ref 150–400)
POTASSIUM SERPL-MCNC: 4.1 MMOL/L — SIGNIFICANT CHANGE UP (ref 3.5–5.3)
POTASSIUM SERPL-SCNC: 4.1 MMOL/L — SIGNIFICANT CHANGE UP (ref 3.5–5.3)
RBC # BLD: 4.38 M/UL — SIGNIFICANT CHANGE UP (ref 3.8–5.2)
RBC # FLD: 12 % — SIGNIFICANT CHANGE UP (ref 10.3–14.5)
SODIUM SERPL-SCNC: 137 MMOL/L — SIGNIFICANT CHANGE UP (ref 135–145)
VANCOMYCIN TROUGH SERPL-MCNC: 7.4 UG/ML — LOW (ref 10–20)
WBC # BLD: 9.7 K/UL — SIGNIFICANT CHANGE UP (ref 3.8–10.5)
WBC # FLD AUTO: 9.7 K/UL — SIGNIFICANT CHANGE UP (ref 3.8–10.5)

## 2017-12-11 RX ORDER — VANCOMYCIN HCL 1 G
1250 VIAL (EA) INTRAVENOUS EVERY 12 HOURS
Qty: 0 | Refills: 0 | Status: DISCONTINUED | OUTPATIENT
Start: 2017-12-11 | End: 2017-12-12

## 2017-12-11 RX ADMIN — Medication 100 MILLIGRAM(S): at 23:02

## 2017-12-11 RX ADMIN — Medication 250 MILLIGRAM(S): at 05:25

## 2017-12-11 RX ADMIN — Medication 1 TABLET(S): at 17:21

## 2017-12-11 RX ADMIN — Medication 1 TABLET(S): at 12:12

## 2017-12-11 RX ADMIN — Medication 100 MILLIGRAM(S): at 05:25

## 2017-12-11 RX ADMIN — METFORMIN HYDROCHLORIDE 500 MILLIGRAM(S): 850 TABLET ORAL at 11:30

## 2017-12-11 RX ADMIN — MEDROXYPROGESTERONE ACETATE 10 MILLIGRAM(S): 150 INJECTION, SUSPENSION, EXTENDED RELEASE INTRAMUSCULAR at 11:30

## 2017-12-11 RX ADMIN — Medication 100 MILLIGRAM(S): at 15:13

## 2017-12-11 RX ADMIN — Medication 50 MILLIGRAM(S): at 05:25

## 2017-12-11 RX ADMIN — Medication 1 TABLET(S): at 08:28

## 2017-12-11 RX ADMIN — LISINOPRIL 10 MILLIGRAM(S): 2.5 TABLET ORAL at 05:25

## 2017-12-11 NOTE — PROGRESS NOTE ADULT - SUBJECTIVE AND OBJECTIVE BOX
Patient is a 22y old  Female who presents with a chief complaint of right facial swelling, pain, non-healing pimple (09 Dec 2017 13:42)      INTERVAL HPI/OVERNIGHT EVENTS: no events overnight , still has facial swelling     MEDICATIONS  (STANDING):  clindamycin IVPB 600 milliGRAM(s) IV Intermittent every 8 hours  lactobacillus acidophilus 1 Tablet(s) Oral three times a day with meals  lisinopril 10 milliGRAM(s) Oral daily  medroxyPROGESTERone 10 milliGRAM(s) Oral daily  metFORMIN 500 milliGRAM(s) Oral daily  metoprolol     tartrate 50 milliGRAM(s) Oral daily  vancomycin  IVPB 1250 milliGRAM(s) IV Intermittent every 12 hours    MEDICATIONS  (PRN):  ibuprofen  Tablet 400 milliGRAM(s) Oral every 6 hours PRN moderate pain      Allergies    penicillin (Vomiting)  shellfish (Swelling (Mild))    Intolerances        REVIEW OF SYSTEMS:  no fever , chills , chest pain , SOB , diarrhea or abdominal pain , still has facial swelling .    Vital Signs Last 24 Hrs  T(C): 37 (11 Dec 2017 05:01), Max: 37 (11 Dec 2017 05:01)  T(F): 98.6 (11 Dec 2017 05:01), Max: 98.6 (11 Dec 2017 05:01)  HR: 89 (11 Dec 2017 05:01) (80 - 95)  BP: 145/83 (11 Dec 2017 05:01) (127/69 - 145/83)  BP(mean): --  RR: 16 (11 Dec 2017 05:01) (16 - 16)  SpO2: 97% (11 Dec 2017 05:01) (97% - 99%)    PHYSICAL EXAM:  GENERAL: NAD, well-groomed, well-developed  EYES: EOMI, PERRLA, conjunctiva and sclera clear swelling of left side of face with redness and erythema   NECK: Supple, No JVD, Normal thyroid  CHEST/LUNG: Clear to percussion bilaterally; No rales, rhonchi, wheezing, or rubs  HEART: Regular rate and rhythm; No murmurs, rubs, or gallops  ABDOMEN: Soft, Nontender, Nondistended; Bowel sounds present  NERVOUS SYSTEM:  Alert & Oriented X3, Good concentration; Motor Strength 5/5 B/L   EXTREMITIES:  2+ Peripheral Pulses, No clubbing, cyanosis, or edema  SKIN;    LABS:                        12.8   9.7   )-----------( 261      ( 11 Dec 2017 04:45 )             38.6     12-11    137  |  105  |  12  ----------------------------<  103<H>  4.1   |  26  |  0.72    Ca    8.8      11 Dec 2017 04:45  Phos  3.6     12-10  Mg     2.2     12-10    TPro  7.7  /  Alb  3.0<L>  /  TBili  0.6  /  DBili  x   /  AST  19  /  ALT  38  /  AlkPhos  89  12-10    PT/INR - ( 10 Dec 2017 07:31 )   PT: 12.3 sec;   INR: 1.13 ratio             CAPILLARY BLOOD GLUCOSE          RADIOLOGY & ADDITIONAL TESTS:    Imaging Personally Reviewed:  [ ] YES  [ ] NO    Consultant(s) Notes Reviewed:  [ ] YES  [ ] NO

## 2017-12-11 NOTE — PROGRESS NOTE ADULT - SUBJECTIVE AND OBJECTIVE BOX
Meds:  clindamycin IVPB 600 milliGRAM(s) IV Intermittent every 8 hours  vancomycin  IVPB 1000 milliGRAM(s) IV Intermittent every 12 hours    Allergies:  Allergies    penicillin (Vomiting)  shellfish (Swelling (Mild))    Intolerances    ROS  [  ] UNABLE TO ELICIT    General:  [  ] None  [  ] Fever  [  ] Chills  [ x ] Malaise    Skin:  [  ] None [  ] Rash  [  ] Wound  [ x ] Right side facial redness and swelling.    HEENT:  [  ] None  [  ] Sore Throat  [  ] Nasal congestion/ runny nose  [  ] Photophobia [  ] Neck pain      Chest:  [ x ] None   [  ] SOB  [  ] Cough  [  ] None    Cardiovascular:   [ x ] None  [  ] CP  [  ] Palpitation    Gastrointestinal:  [ x ] None  [  ] Abd pain   [  ] Nausea    [  ] Vomiting   [  ] Diarrhea	     Genitourinary:  [ x ] None [  ] Polyuria   [  ] Urgency  [  ] Frequency  [  ] Dysuria    [  ]  Hematuria       Musculoskeletal:  [ x ] None [  ] Back Pain	[  ] Body aches  [  ] Joint pain    Neurological: [ x ] None [  ]Dizziness  [  ]Visual Disturbance  [  ]Headaches   [  ] Weakness          PHYSICAL EXAM:  Vital Signs Last 24 Hrs  T(C): 37 (11 Dec 2017 05:01), Max: 37 (11 Dec 2017 05:01)  T(F): 98.6 (11 Dec 2017 05:01), Max: 98.6 (11 Dec 2017 05:01)  HR: 89 (11 Dec 2017 05:01) (80 - 95)  BP: 145/83 (11 Dec 2017 05:01) (127/69 - 145/83)  BP(mean): --  RR: 16 (11 Dec 2017 05:01) (16 - 16)  SpO2: 97% (11 Dec 2017 05:01) (97% - 99%)    Constitutional:    HEENT: [  ] Wnl  [  ] Pharyngeal congestion [ x ] Right side facial, swelling, and induration.    Neck:  [ x ] Supple  [  ]Lymphadenopathy  [ x ] No JVD   [  ] JVD  [  ] Masses   [  ] WNL    CHEST/Respiratory:  [ x ]Clear to auscultation  [  ] Rales   [  ] Rhonchi   [  ] Wheezing     [  ] Chest Tenderness      Cardiovascular:  [ x ] Reg S1 S2   [  ] Irreg S1 S2   [  ]No Murmur  [  ] +ve Murmurs  [  ]Systolic [  ]Diastolic      Abdomen:  [ x ] Soft  [ x ] No tendrerness  [  ] Tenderness  [  ] Organomegaly  [  ] ABD Distention  [  ] Rigidity                       [ x ] No Regidity                       [ x ] No Rebound Tenderness  [  ] No Guarding Rigidity  [  ] Rebound Tenderness[  ] Guarding Rigidity                          [ x ]  +ve Bowel Sounds  [  ] Decreased Bowel Sounds    [  ] Absent Bowel Sounds                            Extremities: [ x ] No edema [  ] Edema  [  ] Clubbing   [  ] Cyanosis                         [ x ] No Tender Calf muscles  [  ] Tender Calf muscles                        [ x ] Palpable peripheral pulses    Neurological: [ x ] Awake  [ x ] Alert  [ x ] Oriented  x  3                           [  ] Confused  [  ] Drowzy  [  ] repond to painful stimuli  [  ] Unresponsive    Skin:  [  ] Intact [  ] Redness [  ] Thrombophlebitis  [  ] Rashes  [  ] Dry  [  ] Ulcers          [ x ] Right side facial, Cheek induration, redness and swelling, but no drainage or discharge.    Ortho:  [  ] Joint Swelling  [  ] Joint erythema [  ] Joint tenderness                [  ] Increased temp. to touch  [  ] DJD [ x ] WNL        LABS/DIAGNOSTIC TESTS                          12.8   9.7   )-----------( 261      ( 11 Dec 2017 04:45 )             38.6   12-11    137  |  105  |  12  ----------------------------<  103<H>  4.1   |  26  |  0.72    Ca    8.8      11 Dec 2017 04:45  Phos  3.6     12-10  Mg     2.2     12-10    TPro  7.7  /  Alb  3.0<L>  /  TBili  0.6  /  DBili  x   /  AST  19  /  ALT  38  /  AlkPhos  89  12-10    Vancomycin Level, Trough (12.11.17 @ 04:45)    Vancomycin Level, Trough: 7.4: Vancomycin trough levels should be rapidly reached and maintained at  15-20 ug/ml for life threatening MRSA  infections such as sepsis, endocarditis, osteomyelitis and pneumonia. A  first trough level should be drawn  before the 3rd or 4th dose.  Risk of renal toxicity is increased for levels >15 ug/ml, in patients on  other nephrotoxic drugs, who are  hemodynamically unstable, have unstable renal function, or are on  Vancomycin therapy for >14 days. Renal function with  creatinine levels should be monitored for those patients. ug/mL                     HIV-1/2 Antigen/Antibody Screen by [facility] (11.12.17 @ 12:53)    HIV-1/2 Combo Result: Nonreact: The HIV Ag/Ab Combo test performed screens for HIV-1 p24 antigen,  Rapid HIV-1/2 Antibody (11.12.17 @ 00:27)    Rapid HIV-1/2 Antibody: Nonreact: The sensitivity and specificity of the assay are >99%.        CULTURES:   Pending.    RADIOLOGY      EXAM:  CT NECK SOFT TISSUE IC                            PROCEDURE DATE:  12/09/2017          INTERPRETATION:  CLINICAL INDICATION:  Neck pain. Evaluate for abscess.    TECHNIQUE:   Axial images were obtained following the intravenous   administration of contrast material. Sagittal and coronal reformatted   images were obtained. 90 cc Omnipaque 350 were administered. 10 cc were   discarded.    COMPARISON EXAMINATION:  None.    FINDINGS:  AERODIGESTIVE TRACT:  Normal.    LYMPH NODES: Subcentimeter lymph nodes are seen in the right   submandibular region and submental region.    PAROTID GLANDS:  Fatty infiltration of both parotid glands.    SUBMANDIBULAR GLANDS:  Normal.    THYROID GLAND:  Normal.    VISUALIZED PARANASAL SINUSES: Mild paranasal sinus mucosal thickening   involving the right maxillary and bilateral sphenoid and ethmoid sinuses.    VISUALIZED TYMPANOMASTOID CAVITIES: Clear.    BONES:  Normal.    MISCELLANEOUS:  There is skin thickening of the right face at the level   of the angle of the right mandible. Subjacent fluid and fat reticulation   is seen. No walled drainable fluid collection is seen. There is reactive   thickening of the right platysma muscle.      IMPRESSION:    Right facial cellulitis. No drainable fluid collection.        Assessment and Recommendation:   21 y/o F, morbidly obese, PMHx of HTN, obesity, PCOS, vitamin D deficiency,  presents to ED c/o right facial pain. Patient reports she had a pimple for 10 days which she squeezed on her bottom right cheek.  Patient was admitted for facial cellulitis, and was started on IV Clindamycin.  Vancomycin was added, and the Clindamycin dose was adjusted.  Right facial swelling is improving and the neck redness and swelling resolved, and patient feels better.  Vancomycin trough was low 12/11/17.    Problem/Recommendation - 1:  Problem: Facial cellulitis.   Recommendation:   1- Blood cultures.  2- Continue Vancomycin, but increase dose to 1.25 gm IVPB q 12 hours and repeat trough.  3- Continue Clindamycin 600 mg IVPB q 8 hours.  4- Surgical evaluation, and follow up.    Problem/Recommendation - 2:  ·  Problem: HTN (hypertension).    Recommendation:   1- Monitor Blood pressure closely.  2- Blood pressure control.  3- BP. meds as per cardiology and primary care team.     Discussed with medical resident. Meds:  clindamycin IVPB 600 milliGRAM(s) IV Intermittent every 8 hours  vancomycin  IVPB 1000 milliGRAM(s) IV Intermittent every 12 hours    Allergies:  Allergies    penicillin (Vomiting)  shellfish (Swelling (Mild))    Intolerances    ROS  [  ] UNABLE TO ELICIT    General:  [  ] None  [  ] Fever  [  ] Chills  [ x ] Malaise    Skin:  [  ] None [  ] Rash  [  ] Wound  [ x ] Right side facial redness and swelling.    HEENT:  [  ] None  [  ] Sore Throat  [  ] Nasal congestion/ runny nose  [  ] Photophobia [  ] Neck pain      Chest:  [ x ] None   [  ] SOB  [  ] Cough  [  ] None    Cardiovascular:   [ x ] None  [  ] CP  [  ] Palpitation    Gastrointestinal:  [ x ] None  [  ] Abd pain   [  ] Nausea    [  ] Vomiting   [  ] Diarrhea	     Genitourinary:  [ x ] None [  ] Polyuria   [  ] Urgency  [  ] Frequency  [  ] Dysuria    [  ]  Hematuria       Musculoskeletal:  [ x ] None [  ] Back Pain	[  ] Body aches  [  ] Joint pain    Neurological: [ x ] None [  ]Dizziness  [  ]Visual Disturbance  [  ]Headaches   [  ] Weakness          PHYSICAL EXAM:  Vital Signs Last 24 Hrs  T(C): 37 (11 Dec 2017 05:01), Max: 37 (11 Dec 2017 05:01)  T(F): 98.6 (11 Dec 2017 05:01), Max: 98.6 (11 Dec 2017 05:01)  HR: 89 (11 Dec 2017 05:01) (80 - 95)  BP: 145/83 (11 Dec 2017 05:01) (127/69 - 145/83)  BP(mean): --  RR: 16 (11 Dec 2017 05:01) (16 - 16)  SpO2: 97% (11 Dec 2017 05:01) (97% - 99%)    Constitutional:    HEENT: [  ] Wnl  [  ] Pharyngeal congestion [ x ] Right side facial, swelling, and induration.    Neck:  [ x ] Supple  [  ]Lymphadenopathy  [ x ] No JVD   [  ] JVD  [  ] Masses   [  ] WNL    CHEST/Respiratory:  [ x ]Clear to auscultation  [  ] Rales   [  ] Rhonchi   [  ] Wheezing     [  ] Chest Tenderness      Cardiovascular:  [ x ] Reg S1 S2   [  ] Irreg S1 S2   [  ]No Murmur  [  ] +ve Murmurs  [  ]Systolic [  ]Diastolic      Abdomen:  [ x ] Soft  [ x ] No tendrerness  [  ] Tenderness  [  ] Organomegaly  [  ] ABD Distention  [  ] Rigidity                       [ x ] No Regidity                       [ x ] No Rebound Tenderness  [  ] No Guarding Rigidity  [  ] Rebound Tenderness[  ] Guarding Rigidity                          [ x ]  +ve Bowel Sounds  [  ] Decreased Bowel Sounds    [  ] Absent Bowel Sounds                            Extremities: [ x ] No edema [  ] Edema  [  ] Clubbing   [  ] Cyanosis                         [ x ] No Tender Calf muscles  [  ] Tender Calf muscles                        [ x ] Palpable peripheral pulses    Neurological: [ x ] Awake  [ x ] Alert  [ x ] Oriented  x  3                           [  ] Confused  [  ] Drowzy  [  ] repond to painful stimuli  [  ] Unresponsive    Skin:  [  ] Intact [  ] Redness [  ] Thrombophlebitis  [  ] Rashes  [  ] Dry  [  ] Ulcers          [ x ] Right side facial, Cheek induration, redness and swelling, but no drainage or discharge.    Ortho:  [  ] Joint Swelling  [  ] Joint erythema [  ] Joint tenderness                [  ] Increased temp. to touch  [  ] DJD [ x ] WNL        LABS/DIAGNOSTIC TESTS                          12.8   9.7   )-----------( 261      ( 11 Dec 2017 04:45 )             38.6   12-11    137  |  105  |  12  ----------------------------<  103<H>  4.1   |  26  |  0.72    Ca    8.8      11 Dec 2017 04:45  Phos  3.6     12-10  Mg     2.2     12-10    TPro  7.7  /  Alb  3.0<L>  /  TBili  0.6  /  DBili  x   /  AST  19  /  ALT  38  /  AlkPhos  89  12-10    Vancomycin Level, Trough (12.11.17 @ 04:45)    Vancomycin Level, Trough: 7.4: Vancomycin trough levels should be rapidly reached and maintained at  15-20 ug/ml for life threatening MRSA  infections such as sepsis, endocarditis, osteomyelitis and pneumonia. A  first trough level should be drawn  before the 3rd or 4th dose.  Risk of renal toxicity is increased for levels >15 ug/ml, in patients on  other nephrotoxic drugs, who are  hemodynamically unstable, have unstable renal function, or are on  Vancomycin therapy for >14 days. Renal function with  creatinine levels should be monitored for those patients. ug/mL                     HIV-1/2 Antigen/Antibody Screen by [facility] (11.12.17 @ 12:53)    HIV-1/2 Combo Result: Nonreact: The HIV Ag/Ab Combo test performed screens for HIV-1 p24 antigen,  Rapid HIV-1/2 Antibody (11.12.17 @ 00:27)    Rapid HIV-1/2 Antibody: Nonreact: The sensitivity and specificity of the assay are >99%.        CULTURES:   Culture - Blood (12.09.17 @ 16:45)    Specimen Source: .Blood Blood    Culture Results:   No growth to date.    Culture - Blood (12.09.17 @ 16:45)    Specimen Source: .Blood Blood    Culture Results:   No growth to date.        RADIOLOGY      EXAM:  CT NECK SOFT TISSUE IC                            PROCEDURE DATE:  12/09/2017          INTERPRETATION:  CLINICAL INDICATION:  Neck pain. Evaluate for abscess.    TECHNIQUE:   Axial images were obtained following the intravenous   administration of contrast material. Sagittal and coronal reformatted   images were obtained. 90 cc Omnipaque 350 were administered. 10 cc were   discarded.    COMPARISON EXAMINATION:  None.    FINDINGS:  AERODIGESTIVE TRACT:  Normal.    LYMPH NODES: Subcentimeter lymph nodes are seen in the right   submandibular region and submental region.    PAROTID GLANDS:  Fatty infiltration of both parotid glands.    SUBMANDIBULAR GLANDS:  Normal.    THYROID GLAND:  Normal.    VISUALIZED PARANASAL SINUSES: Mild paranasal sinus mucosal thickening   involving the right maxillary and bilateral sphenoid and ethmoid sinuses.    VISUALIZED TYMPANOMASTOID CAVITIES: Clear.    BONES:  Normal.    MISCELLANEOUS:  There is skin thickening of the right face at the level   of the angle of the right mandible. Subjacent fluid and fat reticulation   is seen. No walled drainable fluid collection is seen. There is reactive   thickening of the right platysma muscle.      IMPRESSION:    Right facial cellulitis. No drainable fluid collection.        Assessment and Recommendation:   23 y/o F, morbidly obese, PMHx of HTN, obesity, PCOS, vitamin D deficiency,  presents to ED c/o right facial pain. Patient reports she had a pimple for 10 days which she squeezed on her bottom right cheek.  Patient was admitted for facial cellulitis, and was started on IV Clindamycin.  Vancomycin was added, and the Clindamycin dose was adjusted.  Right facial swelling is improving and the neck redness and swelling resolved, and patient feels better.  Vancomycin trough was low 12/11/17.    Problem/Recommendation - 1:  Problem: Facial cellulitis.   Recommendation:   1- Blood cultures.  2- Continue Vancomycin, but increase dose to 1.25 gm IVPB q 12 hours and repeat trough.  3- Continue Clindamycin 600 mg IVPB q 8 hours.  4- Surgical evaluation, and follow up.    Problem/Recommendation - 2:  ·  Problem: HTN (hypertension).    Recommendation:   1- Monitor Blood pressure closely.  2- Blood pressure control.  3- BP. meds as per cardiology and primary care team.     Discussed with medical resident.

## 2017-12-11 NOTE — PROGRESS NOTE ADULT - PROBLEM SELECTOR PLAN 1
Pimple on right cheek for 10 days that breaks, associated with mild pain  Afebrile, mild wbc count , normal lactate   CT neck shows right facial cellulitis, with no drainable abscess,  subcentimeter lymph nodes are seen in the right  submandibular region and submental region.  Fatty infiltration of both parotid glands. Skin thickening of the right face at the level of the angle of the right mandible. Subjacent fluid and fat reticulation is seen. There is reactive thickening of the right platysma muscle.  Got clindamycin and Toradol  Will continue with clindamycin for now, pain control  c/w iv vancomycin and clindamycin  VT subtherapeutic  increased vancomcyin dose today ,  blood cx negative so far

## 2017-12-11 NOTE — DIETITIAN INITIAL EVALUATION ADULT. - PROBLEM SELECTOR PLAN 1
Pimple on right cheek for 10 days that breaks, associated with mild pain  Afebrile, mild wbc count , normal lactate   CT neck shows right facial cellulitis, with no drainable abscess,  subcentimeter lymph nodes are seen in the right  submandibular region and submental region.  Fatty infiltration of both parotid glands. Skin thickening of the right face at the level of the angle of the right mandible. Subjacent fluid and fat reticulation is seen. There is reactive thickening of the right platysma muscle.  Got clindamycin and Toradol  Will continue with clindamycin for now, pain control  ID consult Dr Palmer

## 2017-12-11 NOTE — CHART NOTE - NSCHARTNOTEFT_GEN_A_CORE
Upon Nutritional Assessment by the Registered Dietitian your patient was determined to meet criteria / has evidence of the following diagnosis/diagnoses:          [ ]  Mild Protein Calorie Malnutrition        [ ]  Moderate Protein Calorie Malnutrition        [ ] Severe Protein Calorie Malnutrition        [ ] Unspecified Protein Calorie Malnutrition        [ ] Underweight / BMI <19        [X ] Morbid Obesity / BMI > 40      Findings as based on:  •  Comprehensive nutrition assessment and consultation  •  Calorie counts (nutrient intake analysis)  •  Food acceptance and intake status from observations by staff  •  Follow up  •  Patient education  •  Intervention secondary to interdisciplinary rounds  •   concerns      Treatment:    The following diet has been recommended:      PROVIDER Section:     By signing this assessment you are acknowledging and agree with the diagnosis/diagnoses assigned by the Registered Dietitian    Comments: Pt. pending Bariatric surgery consult with MD/team later this week & see RD's noted, as appropriate.

## 2017-12-11 NOTE — DIETITIAN INITIAL EVALUATION ADULT. - OTHER INFO
Nutrition consult requested for BMI >40. Patient from home lives with family. Visited pt. with aunt at bedside, per pt. appetite good, eats 3 meals daily but had facial swelling PTA, denies nausea/vomiting of diarrhea, stated  Lbs 3 months ago, always been an obese child into teenage years & attempted wt. loss through diet modification but unsuccessful & pending appointment with Bariatric MD/RD for counselling on surgery intervention later this week once d/c. In house pt. consuming >50% of meals & tolerating, skin intact.

## 2017-12-12 ENCOUNTER — TRANSCRIPTION ENCOUNTER (OUTPATIENT)
Age: 22
End: 2017-12-12

## 2017-12-12 VITALS
SYSTOLIC BLOOD PRESSURE: 118 MMHG | DIASTOLIC BLOOD PRESSURE: 68 MMHG | OXYGEN SATURATION: 100 % | TEMPERATURE: 98 F | HEART RATE: 84 BPM | RESPIRATION RATE: 18 BRPM

## 2017-12-12 LAB
ALBUMIN SERPL ELPH-MCNC: 3 G/DL — LOW (ref 3.5–5)
ALP SERPL-CCNC: 89 U/L — SIGNIFICANT CHANGE UP (ref 40–120)
ALT FLD-CCNC: 38 U/L DA — SIGNIFICANT CHANGE UP (ref 10–60)
ANION GAP SERPL CALC-SCNC: 11 MMOL/L — SIGNIFICANT CHANGE UP (ref 5–17)
AST SERPL-CCNC: 21 U/L — SIGNIFICANT CHANGE UP (ref 10–40)
BASOPHILS # BLD AUTO: 0 K/UL — SIGNIFICANT CHANGE UP (ref 0–0.2)
BASOPHILS NFR BLD AUTO: 0.4 % — SIGNIFICANT CHANGE UP (ref 0–2)
BILIRUB SERPL-MCNC: 0.4 MG/DL — SIGNIFICANT CHANGE UP (ref 0.2–1.2)
BUN SERPL-MCNC: 13 MG/DL — SIGNIFICANT CHANGE UP (ref 7–18)
CALCIUM SERPL-MCNC: 9 MG/DL — SIGNIFICANT CHANGE UP (ref 8.4–10.5)
CHLORIDE SERPL-SCNC: 104 MMOL/L — SIGNIFICANT CHANGE UP (ref 96–108)
CO2 SERPL-SCNC: 23 MMOL/L — SIGNIFICANT CHANGE UP (ref 22–31)
CREAT SERPL-MCNC: 0.71 MG/DL — SIGNIFICANT CHANGE UP (ref 0.5–1.3)
EOSINOPHIL # BLD AUTO: 0.2 K/UL — SIGNIFICANT CHANGE UP (ref 0–0.5)
EOSINOPHIL NFR BLD AUTO: 2.2 % — SIGNIFICANT CHANGE UP (ref 0–6)
GLUCOSE SERPL-MCNC: 97 MG/DL — SIGNIFICANT CHANGE UP (ref 70–99)
HCT VFR BLD CALC: 39 % — SIGNIFICANT CHANGE UP (ref 34.5–45)
HGB BLD-MCNC: 12.7 G/DL — SIGNIFICANT CHANGE UP (ref 11.5–15.5)
LYMPHOCYTES # BLD AUTO: 2.8 K/UL — SIGNIFICANT CHANGE UP (ref 1–3.3)
LYMPHOCYTES # BLD AUTO: 31.8 % — SIGNIFICANT CHANGE UP (ref 13–44)
MAGNESIUM SERPL-MCNC: 2.2 MG/DL — SIGNIFICANT CHANGE UP (ref 1.6–2.6)
MCHC RBC-ENTMCNC: 29.2 PG — SIGNIFICANT CHANGE UP (ref 27–34)
MCHC RBC-ENTMCNC: 32.6 GM/DL — SIGNIFICANT CHANGE UP (ref 32–36)
MCV RBC AUTO: 89.8 FL — SIGNIFICANT CHANGE UP (ref 80–100)
MONOCYTES # BLD AUTO: 0.5 K/UL — SIGNIFICANT CHANGE UP (ref 0–0.9)
MONOCYTES NFR BLD AUTO: 5.6 % — SIGNIFICANT CHANGE UP (ref 2–14)
NEUTROPHILS # BLD AUTO: 5.2 K/UL — SIGNIFICANT CHANGE UP (ref 1.8–7.4)
NEUTROPHILS NFR BLD AUTO: 60 % — SIGNIFICANT CHANGE UP (ref 43–77)
PHOSPHATE SERPL-MCNC: 3.8 MG/DL — SIGNIFICANT CHANGE UP (ref 2.5–4.5)
PLATELET # BLD AUTO: 256 K/UL — SIGNIFICANT CHANGE UP (ref 150–400)
POTASSIUM SERPL-MCNC: 3.9 MMOL/L — SIGNIFICANT CHANGE UP (ref 3.5–5.3)
POTASSIUM SERPL-SCNC: 3.9 MMOL/L — SIGNIFICANT CHANGE UP (ref 3.5–5.3)
PROT SERPL-MCNC: 7.9 G/DL — SIGNIFICANT CHANGE UP (ref 6–8.3)
RBC # BLD: 4.34 M/UL — SIGNIFICANT CHANGE UP (ref 3.8–5.2)
RBC # FLD: 12 % — SIGNIFICANT CHANGE UP (ref 10.3–14.5)
SODIUM SERPL-SCNC: 138 MMOL/L — SIGNIFICANT CHANGE UP (ref 135–145)
WBC # BLD: 8.7 K/UL — SIGNIFICANT CHANGE UP (ref 3.8–10.5)
WBC # FLD AUTO: 8.7 K/UL — SIGNIFICANT CHANGE UP (ref 3.8–10.5)

## 2017-12-12 PROCEDURE — 82306 VITAMIN D 25 HYDROXY: CPT

## 2017-12-12 PROCEDURE — 82607 VITAMIN B-12: CPT

## 2017-12-12 PROCEDURE — 84100 ASSAY OF PHOSPHORUS: CPT

## 2017-12-12 PROCEDURE — 82746 ASSAY OF FOLIC ACID SERUM: CPT

## 2017-12-12 PROCEDURE — 80053 COMPREHEN METABOLIC PANEL: CPT

## 2017-12-12 PROCEDURE — 96375 TX/PRO/DX INJ NEW DRUG ADDON: CPT

## 2017-12-12 PROCEDURE — 85610 PROTHROMBIN TIME: CPT

## 2017-12-12 PROCEDURE — 83735 ASSAY OF MAGNESIUM: CPT

## 2017-12-12 PROCEDURE — 80061 LIPID PANEL: CPT

## 2017-12-12 PROCEDURE — 84443 ASSAY THYROID STIM HORMONE: CPT

## 2017-12-12 PROCEDURE — 85027 COMPLETE CBC AUTOMATED: CPT

## 2017-12-12 PROCEDURE — 96374 THER/PROPH/DIAG INJ IV PUSH: CPT | Mod: XU

## 2017-12-12 PROCEDURE — 70491 CT SOFT TISSUE NECK W/DYE: CPT

## 2017-12-12 PROCEDURE — 99285 EMERGENCY DEPT VISIT HI MDM: CPT | Mod: 25

## 2017-12-12 PROCEDURE — 87040 BLOOD CULTURE FOR BACTERIA: CPT

## 2017-12-12 PROCEDURE — 83605 ASSAY OF LACTIC ACID: CPT

## 2017-12-12 PROCEDURE — 83036 HEMOGLOBIN GLYCOSYLATED A1C: CPT

## 2017-12-12 PROCEDURE — 80048 BASIC METABOLIC PNL TOTAL CA: CPT

## 2017-12-12 PROCEDURE — 80202 ASSAY OF VANCOMYCIN: CPT

## 2017-12-12 RX ORDER — LACTOBACILLUS ACIDOPHILUS 100MM CELL
2 CAPSULE ORAL
Qty: 60 | Refills: 0 | OUTPATIENT
Start: 2017-12-12 | End: 2017-12-21

## 2017-12-12 RX ORDER — CIPROFLOXACIN LACTATE 400MG/40ML
1 VIAL (ML) INTRAVENOUS
Qty: 10 | Refills: 0 | OUTPATIENT
Start: 2017-12-12 | End: 2017-12-21

## 2017-12-12 RX ADMIN — Medication 100 MILLIGRAM(S): at 05:35

## 2017-12-12 RX ADMIN — LISINOPRIL 10 MILLIGRAM(S): 2.5 TABLET ORAL at 05:35

## 2017-12-12 RX ADMIN — Medication 166.67 MILLIGRAM(S): at 05:35

## 2017-12-12 RX ADMIN — Medication 1 TABLET(S): at 08:11

## 2017-12-12 RX ADMIN — Medication 50 MILLIGRAM(S): at 05:35

## 2017-12-12 NOTE — DISCHARGE NOTE ADULT - MEDICATION SUMMARY - MEDICATIONS TO TAKE
I will START or STAY ON the medications listed below when I get home from the hospital:    lisinopril 10 mg oral tablet  -- 1 tab(s) by mouth once a day  -- Indication: For HTN (hypertension)    metformin 500 mg oral tablet  --  by mouth once a day  -- Indication: For PCOS (polycystic ovarian syndrome)    Adoxa 100 mg oral tablet  -- 1 tab(s) by mouth 2 times a day   -- Avoid prolonged or excessive exposure to direct and/or artificial sunlight while taking this medication.  Do not take this drug if you are pregnant.  Finish all this medication unless otherwise directed by prescriber.  Medication should be taken with plenty of water.    -- Indication: For Cellulitis    medroxyPROGESTERone 10 mg oral tablet  -- 1 tab(s) by mouth once a day  -- Indication: For PCOS (polycystic ovarian syndrome)    metoprolol 50 mg oral tablet  --  by mouth once a day  -- Indication: For HTN (hypertension)    lactobacillus acidophilus oral capsule  -- 2 tab(s) by mouth 3 times a day with meals x 10 days   -- Indication: For Prophylaxis    Levaquin 500 mg oral tablet  -- 1 tab(s) by mouth every 24 hours   -- Avoid prolonged or excessive exposure to direct and/or artificial sunlight while taking this medication.  Do not take dairy products, antacids, or iron preparations within one hour of this medication.  Finish all this medication unless otherwise directed by prescriber.  May cause drowsiness or dizziness.  Medication should be taken with plenty of water.    -- Indication: For Cellulitis    Vitamin D3 2000 intl units oral capsule  -- 1 cap(s) by mouth once a day  -- Indication: For supplement

## 2017-12-12 NOTE — DISCHARGE NOTE ADULT - PATIENT PORTAL LINK FT
“You can access the [facility] Patient Portal, offered by [facility], by registering with the following website: [email or web address]”

## 2017-12-12 NOTE — DISCHARGE NOTE ADULT - HOSPITAL COURSE
23 y/o F, morbidly obese, PMHx of HTN, obesity, PCOS, vitamin D deficiency,  presents to ED c/o right facial pain. Patient was admitted for right facial cellulitis. CT soft tissue neck showed skin thickening of the right face at the level of the angle of the right mandible. Subjacent fluid and fat reticulation is seen. No walled drainable fluid collection is seen. Patient was started on vancomycin and clindamycin. Blood cultures was negative. Patients facial swelling improved, she is medically stable and will be discharged on levaquin and doxycycline for 10 days.    Comply with medications and follow up with PMD in 1 week  Use diet high in fiber and low in salt  c/w Abx for 10 days  f/u with Dr. Rodriguez for scheduled bariatric surgery

## 2017-12-12 NOTE — DISCHARGE NOTE ADULT - CARE PLAN
Principal Discharge DX:	Facial cellulitis  Goal:	Prevent complications  Instructions for follow-up, activity and diet:	Continue Levaquin and Doxycyline for 10 days  Secondary Diagnosis:	HTN (hypertension)  Secondary Diagnosis:	PCOS (polycystic ovarian syndrome)  Secondary Diagnosis:	Obesity  Secondary Diagnosis:	Need for prophylactic measure

## 2017-12-12 NOTE — PROGRESS NOTE ADULT - SUBJECTIVE AND OBJECTIVE BOX
Patient was seen 11 am today.    Meds:  clindamycin IVPB 600 milliGRAM(s) IV Intermittent every 8 hours  vancomycin  IVPB 1000 milliGRAM(s) IV Intermittent every 12 hours    Allergies:  Allergies    penicillin (Vomiting)  shellfish (Swelling (Mild))    Intolerances    ROS  [  ] UNABLE TO ELICIT    General:  [ x ] None  [  ] Fever  [  ] Chills  [  ] Malaise    Skin:  [  ] None [  ] Rash  [  ] Wound  [ x ] Resolved right side facial redness and swelling.    HEENT:  [  ] None  [  ] Sore Throat  [  ] Nasal congestion/ runny nose  [  ] Photophobia [  ] Neck pain                 [ x ] Resolved right side facial, and neck redness and swelling.    Chest:  [ x ] None   [  ] SOB  [  ] Cough  [  ] None    Cardiovascular:   [ x ] None  [  ] CP  [  ] Palpitation    Gastrointestinal:  [ x ] None  [  ] Abd pain   [  ] Nausea    [  ] Vomiting   [  ] Diarrhea	     Genitourinary:  [ x ] None [  ] Polyuria   [  ] Urgency  [  ] Frequency  [  ] Dysuria    [  ]  Hematuria       Musculoskeletal:  [ x ] None [  ] Back Pain	[  ] Body aches  [  ] Joint pain    Neurological: [ x ] None [  ]Dizziness  [  ]Visual Disturbance  [  ]Headaches   [  ] Weakness          PHYSICAL EXAM:    Vital Signs Last 24 Hrs  T(C): 36.6 (12 Dec 2017 05:38), Max: 36.8 (11 Dec 2017 21:40)  T(F): 97.8 (12 Dec 2017 05:38), Max: 98.3 (11 Dec 2017 21:40)  HR: 84 (12 Dec 2017 05:38) (84 - 86)  BP: 118/68 (12 Dec 2017 05:38) (118/68 - 130/85)  BP(mean): --  RR: 18 (12 Dec 2017 05:38) (16 - 18)  SpO2: 100% (12 Dec 2017 05:38) (100% - 100%)    Constitutional:    HEENT: [  ] Wnl  [  ] Pharyngeal congestion [ x ] Resolved right side facial, neck redness, swelling, and induration.    Neck:  [ x ] Supple  [  ]Lymphadenopathy  [ x ] No JVD   [  ] JVD  [  ] Masses   [  ] WNL    CHEST/Respiratory:  [ x ]Clear to auscultation  [  ] Rales   [  ] Rhonchi   [  ] Wheezing     [  ] Chest Tenderness      Cardiovascular:  [ x ] Reg S1 S2   [  ] Irreg S1 S2   [  ]No Murmur  [  ] +ve Murmurs  [  ]Systolic [  ]Diastolic      Abdomen:  [ x ] Soft  [ x ] No tendrerness  [  ] Tenderness  [  ] Organomegaly  [  ] ABD Distention  [  ] Rigidity                       [ x ] No Regidity                       [ x ] No Rebound Tenderness  [  ] No Guarding Rigidity  [  ] Rebound Tenderness[  ] Guarding Rigidity                          [ x ]  +ve Bowel Sounds  [  ] Decreased Bowel Sounds    [  ] Absent Bowel Sounds                            Extremities: [ x ] No edema [  ] Edema  [  ] Clubbing   [  ] Cyanosis                         [ x ] No Tender Calf muscles  [  ] Tender Calf muscles                        [ x ] Palpable peripheral pulses    Neurological: [ x ] Awake  [ x ] Alert  [ x ] Oriented  x  3                           [  ] Confused  [  ] Drowzy  [  ] repond to painful stimuli  [  ] Unresponsive    Skin:  [  ] Intact [  ] Redness [  ] Thrombophlebitis  [  ] Rashes  [  ] Dry  [  ] Ulcers          [ x ] Resolved right side facial,  and neck redness and swelling.    Ortho:  [  ] Joint Swelling  [  ] Joint erythema [  ] Joint tenderness                [  ] Increased temp. to touch  [  ] DJD [ x ] WNL        LABS/DIAGNOSTIC TESTS                           12.7   8.7   )-----------( 256      ( 12 Dec 2017 07:22 )             39.0   12-12    138  |  104  |  13  ----------------------------<  97  3.9   |  23  |  0.71    Ca    9.0      12 Dec 2017 07:22  Phos  3.8     12-12  Mg     2.2     12-12    TPro  7.9  /  Alb  3.0<L>  /  TBili  0.4  /  DBili  x   /  AST  21  /  ALT  38  /  AlkPhos  89  12-12    HIV-1/2 Antigen/Antibody Screen by [facility] (11.12.17 @ 12:53)    HIV-1/2 Combo Result: Nonreact: The HIV Ag/Ab Combo test performed screens for HIV-1 p24 antigen,  Rapid HIV-1/2 Antibody (11.12.17 @ 00:27)    Rapid HIV-1/2 Antibody: Nonreact: The sensitivity and specificity of the assay are >99%.        CULTURES:   Pending.    RADIOLOGY      EXAM:  CT NECK SOFT TISSUE IC                            PROCEDURE DATE:  12/09/2017          INTERPRETATION:  CLINICAL INDICATION:  Neck pain. Evaluate for abscess.    TECHNIQUE:   Axial images were obtained following the intravenous   administration of contrast material. Sagittal and coronal reformatted   images were obtained. 90 cc Omnipaque 350 were administered. 10 cc were   discarded.    COMPARISON EXAMINATION:  None.    FINDINGS:  AERODIGESTIVE TRACT:  Normal.    LYMPH NODES: Subcentimeter lymph nodes are seen in the right   submandibular region and submental region.    PAROTID GLANDS:  Fatty infiltration of both parotid glands.    SUBMANDIBULAR GLANDS:  Normal.    THYROID GLAND:  Normal.    VISUALIZED PARANASAL SINUSES: Mild paranasal sinus mucosal thickening   involving the right maxillary and bilateral sphenoid and ethmoid sinuses.    VISUALIZED TYMPANOMASTOID CAVITIES: Clear.    BONES:  Normal.    MISCELLANEOUS:  There is skin thickening of the right face at the level   of the angle of the right mandible. Subjacent fluid and fat reticulation   is seen. No walled drainable fluid collection is seen. There is reactive   thickening of the right platysma muscle.      IMPRESSION:    Right facial cellulitis. No drainable fluid collection.        Assessment and Recommendation:   23 y/o F, morbidly obese, PMHx of HTN, obesity, PCOS, vitamin D deficiency,  presents to ED c/o right facial pain. Patient reports she had a pimple for 10 days which she squeezed on her bottom right cheek.  Patient was admitted for facial cellulitis, and was started on IV Clindamycin.  Vancomycin was added, and the Clindamycin dose was adjusted.  12/12/17 Facial swelling, erythema and induration almost completely resolved, and patient wanted to go home, and will give oral Levaquin and doxycycline upon discharge to finish the course of ABX    Problem/Recommendation - 1:  Problem: Facial cellulitis. ( resolving )  Recommendation:   1- Blood cultures.  2- Continue Vancomycin 1 gm IVPB q 12 hours to change to po Doxycycline 100 mg po q 12 hours to finish 14 days of ABX.  3- Continue Clindamycin 600 mg IVPB q 8 hours to change to po Levaquin 500 mg po q 24 hours to finish 14 days of ABX.    Problem/Recommendation - 2:  ·  Problem: HTN (hypertension).    Recommendation:   1- Monitor Blood pressure closely.  2- Blood pressure control.  3- BP. meds as per cardiology and primary care team.     Discussed with medical resident, and with patient on the bedside.

## 2017-12-14 LAB
CULTURE RESULTS: SIGNIFICANT CHANGE UP
CULTURE RESULTS: SIGNIFICANT CHANGE UP
SPECIMEN SOURCE: SIGNIFICANT CHANGE UP
SPECIMEN SOURCE: SIGNIFICANT CHANGE UP

## 2017-12-15 ENCOUNTER — APPOINTMENT (OUTPATIENT)
Dept: BARIATRICS | Facility: CLINIC | Age: 22
End: 2017-12-15
Payer: MEDICAID

## 2017-12-15 VITALS
WEIGHT: 293 LBS | SYSTOLIC BLOOD PRESSURE: 136 MMHG | HEART RATE: 104 BPM | HEIGHT: 63 IN | BODY MASS INDEX: 51.91 KG/M2 | OXYGEN SATURATION: 99 % | DIASTOLIC BLOOD PRESSURE: 83 MMHG | TEMPERATURE: 98.2 F

## 2017-12-15 DIAGNOSIS — F32.9 MAJOR DEPRESSIVE DISORDER, SINGLE EPISODE, UNSPECIFIED: ICD-10-CM

## 2017-12-15 DIAGNOSIS — I10 ESSENTIAL (PRIMARY) HYPERTENSION: ICD-10-CM

## 2017-12-15 DIAGNOSIS — E78.00 PURE HYPERCHOLESTEROLEMIA, UNSPECIFIED: ICD-10-CM

## 2017-12-15 DIAGNOSIS — Z82.49 FAMILY HISTORY OF ISCHEMIC HEART DISEASE AND OTHER DISEASES OF THE CIRCULATORY SYSTEM: ICD-10-CM

## 2017-12-15 DIAGNOSIS — Z84.0 FAMILY HISTORY OF DISEASES OF THE SKIN AND SUBCUTANEOUS TISSUE: ICD-10-CM

## 2017-12-15 DIAGNOSIS — Z78.9 OTHER SPECIFIED HEALTH STATUS: ICD-10-CM

## 2017-12-15 DIAGNOSIS — F41.9 ANXIETY DISORDER, UNSPECIFIED: ICD-10-CM

## 2017-12-15 DIAGNOSIS — Z83.49 FAMILY HISTORY OF OTHER ENDOCRINE, NUTRITIONAL AND METABOLIC DISEASES: ICD-10-CM

## 2017-12-15 PROCEDURE — 99203 OFFICE O/P NEW LOW 30 MIN: CPT

## 2017-12-15 RX ORDER — CHROMIUM 200 MCG
TABLET ORAL
Refills: 0 | Status: ACTIVE | COMMUNITY

## 2017-12-15 RX ORDER — METOPROLOL TARTRATE 50 MG/1
50 TABLET, FILM COATED ORAL
Refills: 0 | Status: ACTIVE | COMMUNITY

## 2017-12-15 RX ORDER — LISINOPRIL 10 MG/1
10 TABLET ORAL
Refills: 0 | Status: ACTIVE | COMMUNITY

## 2017-12-15 RX ORDER — MEDROXYPROGESTERONE ACETATE 5 MG/1
TABLET ORAL
Refills: 0 | Status: ACTIVE | COMMUNITY

## 2017-12-15 RX ORDER — METFORMIN HYDROCHLORIDE 500 MG/1
500 TABLET, COATED ORAL
Refills: 0 | Status: ACTIVE | COMMUNITY

## 2017-12-18 LAB
25(OH)D3 SERPL-MCNC: 15.8 NG/ML
ALBUMIN SERPL ELPH-MCNC: 3.9 G/DL
ALP BLD-CCNC: 86 U/L
ALT SERPL-CCNC: 44 U/L
ANION GAP SERPL CALC-SCNC: 16 MMOL/L
APTT BLD: 31.5 SEC
AST SERPL-CCNC: 25 U/L
BASOPHILS # BLD AUTO: 0.02 K/UL
BASOPHILS NFR BLD AUTO: 0.2 %
BILIRUB SERPL-MCNC: 0.3 MG/DL
BUN SERPL-MCNC: 11 MG/DL
CALCIUM SERPL-MCNC: 9.6 MG/DL
CHLORIDE SERPL-SCNC: 102 MMOL/L
CHOLEST SERPL-MCNC: 218 MG/DL
CHOLEST/HDLC SERPL: 5.2 RATIO
CO2 SERPL-SCNC: 23 MMOL/L
CREAT SERPL-MCNC: 0.77 MG/DL
EOSINOPHIL # BLD AUTO: 0.14 K/UL
EOSINOPHIL NFR BLD AUTO: 1.5 %
FERRITIN SERPL-MCNC: 99 NG/ML
FOLATE SERPL-MCNC: 5.4 NG/ML
GLUCOSE SERPL-MCNC: 98 MG/DL
HBA1C MFR BLD HPLC: 5.4 %
HCT VFR BLD CALC: 40.3 %
HDLC SERPL-MCNC: 42 MG/DL
HGB BLD-MCNC: 12.7 G/DL
IMM GRANULOCYTES NFR BLD AUTO: 1.3 %
INR PPP: 0.95 RATIO
IRON SATN MFR SERPL: 25 %
IRON SERPL-MCNC: 66 UG/DL
LDLC SERPL CALC-MCNC: 157 MG/DL
LYMPHOCYTES # BLD AUTO: 2.68 K/UL
LYMPHOCYTES NFR BLD AUTO: 29 %
MAN DIFF?: NORMAL
MCHC RBC-ENTMCNC: 28.5 PG
MCHC RBC-ENTMCNC: 31.5 GM/DL
MCV RBC AUTO: 90.6 FL
MONOCYTES # BLD AUTO: 0.54 K/UL
MONOCYTES NFR BLD AUTO: 5.8 %
NEUTROPHILS # BLD AUTO: 5.75 K/UL
NEUTROPHILS NFR BLD AUTO: 62.2 %
PLATELET # BLD AUTO: 318 K/UL
POTASSIUM SERPL-SCNC: 4.7 MMOL/L
PROT SERPL-MCNC: 7.7 G/DL
PT BLD: 10.7 SEC
RBC # BLD: 4.45 M/UL
RBC # FLD: 13.5 %
SODIUM SERPL-SCNC: 141 MMOL/L
TIBC SERPL-MCNC: 266 UG/DL
TRIGL SERPL-MCNC: 97 MG/DL
TSH SERPL-ACNC: 6.75 UIU/ML
UIBC SERPL-MCNC: 200 UG/DL
VIT B12 SERPL-MCNC: 373 PG/ML
WBC # FLD AUTO: 9.25 K/UL

## 2017-12-22 LAB — VIT B1 SERPL-MCNC: 136.6 NMOL/L

## 2018-01-24 ENCOUNTER — OTHER (OUTPATIENT)
Age: 23
End: 2018-01-24

## 2018-10-14 PROBLEM — E28.2 POLYCYSTIC OVARIAN SYNDROME: Chronic | Status: ACTIVE | Noted: 2017-11-12

## 2018-10-17 ENCOUNTER — RESULT REVIEW (OUTPATIENT)
Age: 23
End: 2018-10-17

## 2018-10-17 ENCOUNTER — OUTPATIENT (OUTPATIENT)
Dept: OUTPATIENT SERVICES | Facility: HOSPITAL | Age: 23
LOS: 1 days | End: 2018-10-17
Payer: MEDICAID

## 2018-10-17 DIAGNOSIS — K92.2 GASTROINTESTINAL HEMORRHAGE, UNSPECIFIED: ICD-10-CM

## 2018-10-17 LAB — HCG UR QL: NEGATIVE — SIGNIFICANT CHANGE UP

## 2018-10-17 PROCEDURE — 81025 URINE PREGNANCY TEST: CPT

## 2018-10-17 PROCEDURE — 43239 EGD BIOPSY SINGLE/MULTIPLE: CPT

## 2018-10-17 PROCEDURE — 88312 SPECIAL STAINS GROUP 1: CPT

## 2018-10-17 PROCEDURE — 88305 TISSUE EXAM BY PATHOLOGIST: CPT

## 2018-10-17 PROCEDURE — 88305 TISSUE EXAM BY PATHOLOGIST: CPT | Mod: 26

## 2018-10-17 PROCEDURE — 88312 SPECIAL STAINS GROUP 1: CPT | Mod: 26

## 2018-10-19 LAB — SURGICAL PATHOLOGY STUDY: SIGNIFICANT CHANGE UP

## 2019-03-01 ENCOUNTER — OUTPATIENT (OUTPATIENT)
Dept: OUTPATIENT SERVICES | Facility: HOSPITAL | Age: 24
LOS: 1 days | End: 2019-03-01
Payer: MEDICAID

## 2019-03-01 PROCEDURE — G9001: CPT

## 2019-03-06 ENCOUNTER — EMERGENCY (EMERGENCY)
Facility: HOSPITAL | Age: 24
LOS: 1 days | Discharge: HOME | End: 2019-03-06
Attending: STUDENT IN AN ORGANIZED HEALTH CARE EDUCATION/TRAINING PROGRAM
Payer: MEDICAID

## 2019-03-06 VITALS
HEART RATE: 86 BPM | SYSTOLIC BLOOD PRESSURE: 179 MMHG | TEMPERATURE: 98 F | OXYGEN SATURATION: 99 % | RESPIRATION RATE: 18 BRPM | DIASTOLIC BLOOD PRESSURE: 101 MMHG

## 2019-03-06 PROCEDURE — 99283 EMERGENCY DEPT VISIT LOW MDM: CPT

## 2019-03-06 RX ORDER — FAMOTIDINE 10 MG/ML
20 INJECTION INTRAVENOUS DAILY
Qty: 0 | Refills: 0 | Status: DISCONTINUED | OUTPATIENT
Start: 2019-03-06 | End: 2019-03-10

## 2019-03-06 RX ORDER — DIPHENHYDRAMINE HCL 50 MG
50 CAPSULE ORAL ONCE
Qty: 0 | Refills: 0 | Status: COMPLETED | OUTPATIENT
Start: 2019-03-06 | End: 2019-03-06

## 2019-03-06 RX ADMIN — Medication 50 MILLIGRAM(S): at 19:32

## 2019-03-06 RX ADMIN — FAMOTIDINE 20 MILLIGRAM(S): 10 INJECTION INTRAVENOUS at 19:32

## 2019-03-06 RX ADMIN — Medication 40 MILLIGRAM(S): at 19:32

## 2019-03-06 NOTE — ED PROVIDER NOTE - CLINICAL SUMMARY MEDICAL DECISION MAKING FREE TEXT BOX
25 yo female presenting with rash; likely urticaria given recent levothyroxine exposure; will give symptom control re eval and likely dc

## 2019-03-06 NOTE — ED ADULT NURSE NOTE - OBJECTIVE STATEMENT
pt came in w c/o allergic rx s/p taking levothyroxine 25mcg yesterday. pt has generalized redness, rash, hives & itching throughout body. Denies cp/sob. Breathing unlabored.

## 2019-03-06 NOTE — ED PROVIDER NOTE - OBJECTIVE STATEMENT
25 yo female presenting with 2 day hx of itchy rash that is on both arms, neck, face and back.  itchy, not painful.  has been taking bendaryl and loratidine for her symptoms with mild transient relief of symptoms.  Started taking levothyroxine two days ago and rash started shortly after.  Denies any other recent new allergen that patient can think of.

## 2019-03-06 NOTE — ED PROVIDER NOTE - NS ED ROS FT
Constitutional: no fever  Eyes: no conjunctivitis  Ears: no ear pain   Nose: no nasal congestion, Mouth/Throat: no throat pain, Neck: no stiffness  Cardiovascular: no chest pain  Chest: no cough  Gastrointestinal: no abdominal pain, no vomiting and diarrhea  MSK: no joint pain  : no dysuria  Skin: + rash  Neuro: no LOC

## 2019-03-06 NOTE — ED PROVIDER NOTE - PHYSICAL EXAMINATION
gen: well appearing  Mentation: AAO x 3  psych: mood appropriate  ENT: airway patent  Eyes: conjunctivae clear bilaterally  Cardio: RRR, no m/r/g  Resp: normal BS b/l  GI: s/nt/nd   Neuro: AAO x 3, sensation and motor function intact, CN 2-12 intact  Skin: urticarial rash present on bilateral arms, left side of neck and face and back   MSK: normal movement of all extremities

## 2019-03-06 NOTE — ED ADULT NURSE NOTE - CHPI ED NUR SYMPTOMS NEG
no pain/no petechia/no confusion/no body aches/no scaly patches on skin/no decreased eating/drinking/no vomiting/no inflammation/no chills/no fever

## 2019-03-06 NOTE — ED PROVIDER NOTE - NSFOLLOWUPINSTRUCTIONS_ED_ALL_ED_FT
Follow up with your medical doctor in 2-3 days or call our clinic at [phone] and state you were seen in the Emergency Department and would like to be seen in clinic.   Please take prednisone 40 mg once a day for the next 4 days.  Please take benadryl 50mg every 6 hours as needed for itchiness  Drink at least 2 Liters or 64 Ounces of water each day.  Return for any persistent, worsening symptoms, or ANY concerns at all.

## 2019-03-18 DIAGNOSIS — Z71.89 OTHER SPECIFIED COUNSELING: ICD-10-CM

## 2019-07-19 PROBLEM — E66.01 MORBID OBESITY: Status: ACTIVE | Noted: 2017-12-15

## 2019-07-24 ENCOUNTER — APPOINTMENT (OUTPATIENT)
Dept: SURGERY | Facility: CLINIC | Age: 24
End: 2019-07-24
Payer: MEDICAID

## 2019-07-24 ENCOUNTER — TRANSCRIPTION ENCOUNTER (OUTPATIENT)
Age: 24
End: 2019-07-24

## 2019-07-24 VITALS
OXYGEN SATURATION: 99 % | HEIGHT: 63 IN | DIASTOLIC BLOOD PRESSURE: 93 MMHG | WEIGHT: 293 LBS | HEART RATE: 116 BPM | BODY MASS INDEX: 51.91 KG/M2 | SYSTOLIC BLOOD PRESSURE: 128 MMHG

## 2019-07-24 DIAGNOSIS — Z80.0 FAMILY HISTORY OF MALIGNANT NEOPLASM OF DIGESTIVE ORGANS: ICD-10-CM

## 2019-07-24 DIAGNOSIS — E66.01 MORBID (SEVERE) OBESITY DUE TO EXCESS CALORIES: ICD-10-CM

## 2019-07-24 DIAGNOSIS — Z87.42 PERSONAL HISTORY OF OTHER DISEASES OF THE FEMALE GENITAL TRACT: ICD-10-CM

## 2019-07-24 DIAGNOSIS — E07.9 DISORDER OF THYROID, UNSPECIFIED: ICD-10-CM

## 2019-07-24 DIAGNOSIS — Z80.3 FAMILY HISTORY OF MALIGNANT NEOPLASM OF BREAST: ICD-10-CM

## 2019-07-24 DIAGNOSIS — Z80.1 FAMILY HISTORY OF MALIGNANT NEOPLASM OF TRACHEA, BRONCHUS AND LUNG: ICD-10-CM

## 2019-07-24 DIAGNOSIS — Z86.79 PERSONAL HISTORY OF OTHER DISEASES OF THE CIRCULATORY SYSTEM: ICD-10-CM

## 2019-07-24 DIAGNOSIS — Z86.59 PERSONAL HISTORY OF OTHER MENTAL AND BEHAVIORAL DISORDERS: ICD-10-CM

## 2019-07-24 PROCEDURE — 99214 OFFICE O/P EST MOD 30 MIN: CPT

## 2019-07-24 RX ORDER — OMEPRAZOLE 20 MG/1
TABLET, DELAYED RELEASE ORAL
Refills: 0 | Status: ACTIVE | COMMUNITY

## 2019-07-24 NOTE — PHYSICAL EXAM
[Obese, well nourished, in no acute distress] : obese, well nourished, in no acute distress [Normal] : affect appropriate [de-identified] : Obese, protuberant. Soft, nontender, nondistended, no rebound or guarding. [de-identified] : short

## 2019-07-24 NOTE — REVIEW OF SYSTEMS
[Recent Change In Weight] : ~T recent weight change [Patient Intake Form Reviewed] : Patient intake form was reviewed [Negative] : Heme/Lymph [Fever] : no fever [Chills] : no chills [Dysphagia] : no dysphagia [Shortness Of Breath] : no shortness of breath [Cough] : no cough [SOB on Exertion] : no shortness of breath during exertion [de-identified] : increased appetite, thyroid problems  [FreeTextEntry9] : back pain

## 2019-07-24 NOTE — HISTORY OF PRESENT ILLNESS
[de-identified] : Ms Enriquez presents for bariatric surgery consultation. Patient has a long-standing history of severe obesity refractory to multiple prior attempts at weight loss including conservative treatments of diet and exercise. Patient has been obese since childhood, and the most weight that patient has been able to lose by any means is negligible.  \par Previous weight loss efforts include: Calorie-counting, restricted intake. Patient has limited capacity for exercise due to excess weight. patient was seen by Dr Foreman in 2017 and started the work up in preparation for surgery. patient had EGD in 2017 that showed she had H- pylori. she was treated and never follow up on the outcome of the treatment.  patient saw a pulmonary specialist in 2017 and had a PFT test. she does not know the results. she never had a sleet study or was ever diagnosed with EVERT. patient is interested in sleeve gastrectomy. Patient feels that weight loss surgery is the only option at this point for effective and clinically meaningful weight loss.

## 2019-07-24 NOTE — ASSESSMENT
[FreeTextEntry1] : Clinical Impression\par Patient is a 24 year old female with a BMI of 65 which places patient in the morbidly obese category.  Patient also suffers from hypertension.  This has directly contributed to patient's  current medical conditions as well as, a decreased quality of life.  Patient has very little chance of successfully losing and maintaining a significant amount of weight with non-surgical management, and would benefit from surgical intervention.  Patient meets the criteria for weight loss surgery as defined in the NIH consensus statement, surgery is medically necessary. \par Given patient’s current BMI and obesity-related comorbidities, I feel the patient is a candidate for and would benefit from weight loss surgery, as all prior attempts by non-surgical means have been futile.\par

## 2019-07-24 NOTE — REASON FOR VISIT
[Follow-Up Visit] : a follow-up visit for [Morbid Obesity (BMI>40)] : morbid obesity (bmi>40) [Parent] : parent

## 2019-07-24 NOTE — CONSULT LETTER
[Consult Letter:] : I had the pleasure of evaluating your patient, [unfilled]. [Dear  ___] : Dear  [unfilled], [Consult Closing:] : Thank you very much for allowing me to participate in the care of this patient.  If you have any questions, please do not hesitate to contact me. [Please see my note below.] : Please see my note below. [Sincerely,] : Sincerely, [FreeTextEntry3] : Rogers

## 2019-08-28 ENCOUNTER — RESULT REVIEW (OUTPATIENT)
Age: 24
End: 2019-08-28

## 2019-08-28 ENCOUNTER — OUTPATIENT (OUTPATIENT)
Dept: OUTPATIENT SERVICES | Facility: HOSPITAL | Age: 24
LOS: 1 days | End: 2019-08-28
Payer: MEDICAID

## 2019-08-28 DIAGNOSIS — K92.2 GASTROINTESTINAL HEMORRHAGE, UNSPECIFIED: ICD-10-CM

## 2019-08-28 LAB — HCG UR QL: NEGATIVE — SIGNIFICANT CHANGE UP

## 2019-08-28 PROCEDURE — 88305 TISSUE EXAM BY PATHOLOGIST: CPT | Mod: 26

## 2019-08-28 PROCEDURE — 88312 SPECIAL STAINS GROUP 1: CPT

## 2019-08-28 PROCEDURE — 88305 TISSUE EXAM BY PATHOLOGIST: CPT

## 2019-08-28 PROCEDURE — 81025 URINE PREGNANCY TEST: CPT

## 2019-08-28 PROCEDURE — 88312 SPECIAL STAINS GROUP 1: CPT | Mod: 26

## 2019-08-28 PROCEDURE — 43239 EGD BIOPSY SINGLE/MULTIPLE: CPT

## 2019-08-30 LAB — SURGICAL PATHOLOGY STUDY: SIGNIFICANT CHANGE UP

## 2019-09-18 ENCOUNTER — OTHER (OUTPATIENT)
Age: 24
End: 2019-09-18

## 2019-09-25 ENCOUNTER — APPOINTMENT (OUTPATIENT)
Dept: SURGERY | Facility: CLINIC | Age: 24
End: 2019-09-25

## 2019-10-14 ENCOUNTER — APPOINTMENT (OUTPATIENT)
Dept: CARDIOLOGY | Facility: CLINIC | Age: 24
End: 2019-10-14

## 2020-12-04 ENCOUNTER — EMERGENCY (EMERGENCY)
Facility: HOSPITAL | Age: 25
LOS: 1 days | Discharge: HOME | End: 2020-12-04
Attending: STUDENT IN AN ORGANIZED HEALTH CARE EDUCATION/TRAINING PROGRAM
Payer: MEDICAID

## 2020-12-04 VITALS
HEIGHT: 63 IN | OXYGEN SATURATION: 98 % | HEART RATE: 93 BPM | TEMPERATURE: 98 F | DIASTOLIC BLOOD PRESSURE: 81 MMHG | SYSTOLIC BLOOD PRESSURE: 139 MMHG | RESPIRATION RATE: 18 BRPM

## 2020-12-04 PROCEDURE — 99283 EMERGENCY DEPT VISIT LOW MDM: CPT

## 2020-12-04 PROCEDURE — 99282 EMERGENCY DEPT VISIT SF MDM: CPT

## 2020-12-04 RX ORDER — NEOMYCIN/POLYMYXIN B/HYDROCORT
3 SUSPENSION, DROPS(FINAL DOSAGE FORM)(ML) OTIC (EAR)
Qty: 63 | Refills: 0
Start: 2020-12-04 | End: 2020-12-10

## 2020-12-04 NOTE — ED PROVIDER NOTE - CLINICAL SUMMARY MEDICAL DECISION MAKING FREE TEXT BOX
26yo F p/w R ear pain 2/2 otitis externa. No hearing/neuro changes, no other sxs. Well appearing, afebrile, no s/s systemic infection otherwise.

## 2020-12-04 NOTE — ED PROVIDER NOTE - PATIENT PORTAL LINK FT
You can access the FollowMyHealth Patient Portal offered by [facility] by registering at the following website: [email or web address]. By joining [employer]’s FollowMyHealth portal, you will also be able to view your health information using other applications (apps) compatible with our system.

## 2020-12-04 NOTE — ED PROVIDER NOTE - OBJECTIVE STATEMENT
26yo F no significant pmhx p/w R ear pain x3 days. Patient states that she was experiencing R ear itching few days ago, was scratching it with qtip, then began to experience pain. Used OTC itch relief drops, which provided no relief. Patient endorses pain is aching, intermittent, worsened by palpating R ear canal. Denies ear fullness, tinnitus, dizziness, vision changes, ear drainage, bleeding, hearing change or other sxs otherwise.

## 2020-12-04 NOTE — ED PROVIDER NOTE - CHPI ED SYMPTOMS NEG
no fever/no change in level of consciousness/no chills/no blurred vision/no weakness/no vomiting/no loss of consciousness/no nausea/no syncope/no numbness

## 2020-12-04 NOTE — ED ADULT NURSE NOTE - NSIMPLEMENTINTERV_GEN_ALL_ED
Implemented All Universal Safety Interventions:  [place] to call system. Call bell, personal items and telephone within reach. Instruct patient to call for assistance. Room bathroom lighting operational. Non-slip footwear when patient is off stretcher. Physically safe environment: no spills, clutter or unnecessary equipment. Stretcher in lowest position, wheels locked, appropriate side rails in place.

## 2020-12-04 NOTE — ED ADULT NURSE NOTE - TEMPLATE
Vit D 2 capsules each week for 3 month and then will change to 1 cap every week  Blood test in oct  Bone density in oct          Lab Result Notifications    · If labs were ordered at your appointment today, we will contact you with results once all your labs have resulted. Please note certain hormone labs can take up to 10 business days to result.     Prescription Policy     Our goal is to serve you on a more timely basis. Currently, our office receives a large volume of phone requests for medication refills. We are requesting your cooperation with the following:    · Look over your medications, diabetic supplies, etc. before coming to your appointment to see if you need any refills.    · Request your medication (or supply) refills during your office visit.    · Outside of an office visit, requests should be directed to your pharmacy even if you have zero refills. Call your pharmacy after 72 hours to see if your prescription is ready for pick-up.     Pharmacy Refills: All prescriptions take 48-72 hours to refill.          Our Patients are Important    We want to improve and you can help. You may receive a survey asking you about this visit. Please complete this survey; we will use your feedback to make improvements. Thank you.   
EENMT

## 2020-12-06 ENCOUNTER — EMERGENCY (EMERGENCY)
Facility: HOSPITAL | Age: 25
LOS: 1 days | Discharge: HOME | End: 2020-12-06
Attending: EMERGENCY MEDICINE
Payer: MEDICAID

## 2020-12-06 VITALS
SYSTOLIC BLOOD PRESSURE: 148 MMHG | OXYGEN SATURATION: 98 % | DIASTOLIC BLOOD PRESSURE: 83 MMHG | TEMPERATURE: 98 F | HEIGHT: 63 IN | WEIGHT: 293 LBS | RESPIRATION RATE: 16 BRPM | HEART RATE: 95 BPM

## 2020-12-06 PROCEDURE — 99283 EMERGENCY DEPT VISIT LOW MDM: CPT

## 2020-12-06 RX ORDER — IBUPROFEN 200 MG
600 TABLET ORAL ONCE
Refills: 0 | Status: COMPLETED | OUTPATIENT
Start: 2020-12-06 | End: 2020-12-06

## 2020-12-06 RX ADMIN — Medication 600 MILLIGRAM(S): at 11:50

## 2020-12-06 NOTE — ED PROVIDER NOTE - CLINICAL SUMMARY MEDICAL DECISION MAKING FREE TEXT BOX
25 year-old female, presents with persistent R ear pain/drainage. Well-appearing, mild hypertension, afebrile. History and findings consistent with AOE. Will continue same otic antibiotic. Ear wick inserted to facilitate antibiotic administration. No signs of mastoiditis/meningitis or deep neck abscess. WIll dc with PMD follow up in 2-3 days.

## 2020-12-06 NOTE — ED PROVIDER NOTE - PATIENT PORTAL LINK FT
You can access the FollowMyHealth Patient Portal offered by [facility] by registering at the following website: [email or web address]. By joining [employer]’s FollowMyHealth portal, you will also be able to view your health information using other applications (apps) compatible with our system.

## 2020-12-06 NOTE — ED PROVIDER NOTE - OBJECTIVE STATEMENT
25 year-old female, history of morbid obesity, PCOS, hypothyroidism, presents with cc persistent R ear pain x few days. Gradual onset of localized R ear pain, discharge. No alleviating or aggravating factors. Was evaluated 2 days ago and was given otic antibiotic. Still feels pain and feels that antibiotic is not going inside the ear canal. Denies any fever, chills, hearing change, mastoid pain, neck pain/stiffness, photophobia or any other complaints.

## 2020-12-06 NOTE — ED PROVIDER NOTE - RIGHT EAR
TM clear/Ear canal moderate edema and mild erythema. +clear drainage. Pain with external ear manipulation. No mastoid tenderness or discoloration.

## 2020-12-06 NOTE — ED ADULT NURSE NOTE - OBJECTIVE STATEMENT
Pt c/o of right ear pain started 3 AM and unrelieved with 400mg Motrin at 6AM. Denies dizziness, nausea, vomiting, and fever. Denies any other complaints. Pain is 6/10 which comes and goes

## 2020-12-06 NOTE — ED PROVIDER NOTE - NSFOLLOWUPINSTRUCTIONS_ED_ALL_ED_FT
Follow up with the primary care doctor in 2-3 days.  For pain you can take over the counter Ibuprofen 600 mg orally every 6 hours as needed for pain. Take medication with food.   If you experience any new or worsening symptoms or if you are concerned you can always come back to the emergency for a re-evaluation.

## 2020-12-06 NOTE — ED PROVIDER NOTE - ATTENDING CONTRIBUTION TO CARE
I completed an independent physical examination.   I have signed out the follow up of any pending tests (i.e. labs, radiological studies) to the PA/NP.  I have discussed the patient’s plan of care and disposition with the PA/NP    patient with AOE. On Abx. Will recommend continued use and dc home.

## 2021-04-20 NOTE — ED PROVIDER NOTE - TIMING
Pt resting comfortably in bed. VSS, side rails up x 2. Bed at lowest height. call light within reach.      gradual onset

## 2021-08-10 ENCOUNTER — EMERGENCY (EMERGENCY)
Facility: HOSPITAL | Age: 26
LOS: 1 days | Discharge: HOME | End: 2021-08-10
Attending: EMERGENCY MEDICINE
Payer: MEDICAID

## 2021-08-10 VITALS
HEART RATE: 85 BPM | SYSTOLIC BLOOD PRESSURE: 110 MMHG | OXYGEN SATURATION: 99 % | WEIGHT: 293 LBS | TEMPERATURE: 99 F | RESPIRATION RATE: 20 BRPM | DIASTOLIC BLOOD PRESSURE: 64 MMHG | HEIGHT: 63 IN

## 2021-08-10 PROBLEM — E66.9 OBESITY, UNSPECIFIED: Chronic | Status: ACTIVE | Noted: 2020-12-06

## 2021-08-10 PROCEDURE — 99283 EMERGENCY DEPT VISIT LOW MDM: CPT

## 2021-08-10 PROCEDURE — 99282 EMERGENCY DEPT VISIT SF MDM: CPT

## 2021-08-10 RX ORDER — LIDOCAINE HCL 20 MG/ML
5 VIAL (ML) INJECTION ONCE
Refills: 0 | Status: COMPLETED | OUTPATIENT
Start: 2021-08-10 | End: 2021-08-10

## 2021-08-10 RX ADMIN — Medication 5 MILLILITER(S): at 02:47

## 2021-08-10 NOTE — ED PROVIDER NOTE - CLINICAL SUMMARY MEDICAL DECISION MAKING FREE TEXT BOX
27yo F presents with splinter foreign body in finger. Attempted to remove in ED. Patient stable for discharge.

## 2021-08-10 NOTE — ED PROVIDER NOTE - OBJECTIVE STATEMENT
25 y/o female h/o HTN, PCOS, presents with R middle finger pain. Reports she was cleaning a wooden table and sustained a splinter to her R middle finger. She tried to remove the splinter with a tweezer and clippers but was unsuccessful. Pain is not severe, just feels a pricking sensation over the puncture site.

## 2021-08-10 NOTE — ED PROVIDER NOTE - PATIENT PORTAL LINK FT
You can access the FollowMyHealth Patient Portal offered by [facility] by registering at the following website: [email or web address]. By joining [employer]’s FollowMyHealth portal, you will also be able to view your health information using other applications (apps) compatible with our system.

## 2021-08-10 NOTE — ED ADULT NURSE NOTE - NSIMPLEMENTINTERV_GEN_ALL_ED
Implemented All Universal Safety Interventions:  [place] to call system. Call bell, personal items and telephone within reach. Instruct patient to call for assistance. Room bathroom lighting operational. Non-slip footwear when patient is off stretcher. Physically safe environment: no spills, clutter or unnecessary equipment. Stretcher in lowest position, wheels locked, appropriate side rails in place.

## 2021-08-10 NOTE — ED PROVIDER NOTE - NSFOLLOWUPINSTRUCTIONS_ED_ALL_ED_FT
Keep wound clean and dry. Any small splinter left in place will be pushed out by skin.   Return to ED if you develop increased redness, swelling or pus from wound.

## 2022-12-01 NOTE — ED PROVIDER NOTE - ATTESTATION, MLM
18 I have reviewed and confirmed nurses' notes for patient's medications, allergies, medical history, and surgical history.

## 2023-02-21 NOTE — H&P ADULT - NECK DETAILS
No excercise/No heavy lifting/No sports/gym/Weight bearing as tolerated
normal thyroid gland/supple/normal/no JVD

## 2023-03-28 NOTE — ED ADULT NURSE NOTE - NSSISCREENINGQ4_ED_A_ED
DISCHARGE PLANNING NOTE - TOTAL JOINT    Procedure: Procedure(s):  LEFT PRIMARY VERSUS REVERSE TOTAL SHOULDER ARTHROPLASTY  Procedure Date: 4/11/2023  Insurance: Payor: MEDICARE / Plan: MEDICARE PART A & B / Product Type: *No Product type* /    Equipment currently available at home?  raised toilet seat and ice  Steps into the home? 0  Steps within the home? 0  Toilet height? Standard  Type of shower? walk-in shower  Who will be with you during your recovery? Friend, Alba  Is Outpatient Physical Therapy set up after surgery? Yes  Did you take the Total Joint Class and where? No   Planning same day discharge?Yes     0931: This writer phoned pt for her preadmission appt and no answer. Message left on voice mail for a return call to [phone].    7940:   Pt phoned back. Appt completed except for shower instructions. Pt will be here 4/10 to see Dr. 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   Pt educated to dc criteria. All questions answered and verbalizes understanding of all instructions. No dc needs identified at this time. Anticipate dc to home without barriers.    1000: called pt back to inform her that I will be mailing chg kit after all so pt gets all three showers in before surgery and pt is agreeable.     No

## 2023-05-09 NOTE — DIETITIAN INITIAL EVALUATION ADULT. - DIET TYPE
DASH/TLC (sodium and cholesterol restricted diet) Clofazimine Counseling:  I discussed with the patient the risks of clofazimine including but not limited to skin and eye pigmentation, liver damage, nausea/vomiting, gastrointestinal bleeding and allergy.

## 2024-02-29 NOTE — ED ADULT NURSE NOTE - OBJECTIVE STATEMENT
Lab Results   Component Value Date    WBC 23.89 (H) 02/28/2024    See plan above for infectious workup   c/o rt. FA insect bite, warm to the touch, pain  and + redness noted

## 2024-11-15 NOTE — ED PROVIDER NOTE - TEMPLATE, MLM
Wounds Detail Level: Zone Add 52 Modifier (Optional): no Show Applicator Variable?: Yes Medical Necessity Clause: This procedure was medically necessary because the lesions that were treated were: Spray Paint Text: The liquid nitrogen was applied to the skin utilizing a spray paint frosting technique. Post-Care Instructions: I reviewed with the patient in detail post-care instructions. Patient is to wear sunprotection, and avoid picking at any of the treated lesions. Pt may apply Vaseline to crusted or scabbing areas. Medical Necessity Information: It is in your best interest to select a reason for this procedure from the list below. All of these items fulfill various CMS LCD requirements except the new and changing color options. Consent: The patient's consent was obtained including but not limited to risks of crusting, scabbing, blistering, scarring, darker or lighter pigmentary change, recurrence, incomplete removal and infection.

## 2025-01-27 ENCOUNTER — EMERGENCY (EMERGENCY)
Facility: HOSPITAL | Age: 30
LOS: 1 days | Discharge: HOME | End: 2025-01-27
Attending: EMERGENCY MEDICINE
Payer: MEDICAID

## 2025-01-27 VITALS
SYSTOLIC BLOOD PRESSURE: 159 MMHG | TEMPERATURE: 98 F | OXYGEN SATURATION: 99 % | WEIGHT: 293 LBS | DIASTOLIC BLOOD PRESSURE: 85 MMHG | RESPIRATION RATE: 16 BRPM | HEART RATE: 84 BPM

## 2025-01-27 PROCEDURE — 29515 APPLICATION SHORT LEG SPLINT: CPT | Mod: RT

## 2025-01-27 PROCEDURE — 73630 X-RAY EXAM OF FOOT: CPT

## 2025-01-27 PROCEDURE — 99284 EMERGENCY DEPT VISIT MOD MDM: CPT | Mod: 25

## 2025-01-27 PROCEDURE — 73630 X-RAY EXAM OF FOOT: CPT | Mod: 26,RT

## 2025-01-27 PROCEDURE — 73610 X-RAY EXAM OF ANKLE: CPT | Mod: 26,RT

## 2025-01-27 PROCEDURE — 73610 X-RAY EXAM OF ANKLE: CPT

## 2025-01-27 RX ORDER — IBUPROFEN 200 MG
600 TABLET ORAL ONCE
Refills: 0 | Status: COMPLETED | OUTPATIENT
Start: 2025-01-27 | End: 2025-01-27

## 2025-01-27 RX ADMIN — Medication 600 MILLIGRAM(S): at 18:33

## 2025-01-27 RX ADMIN — Medication 600 MILLIGRAM(S): at 18:03

## 2025-01-27 NOTE — ED PROVIDER NOTE - OBJECTIVE STATEMENT
29-year-old female no pertinent past medical history presenting to emergency department with right ankle and right midfoot pain since last night.  Patient states she first noticed the pain when she was attempting to ambulate to the bathroom in the middle of the night.  At that time she felt she heard a crack in her ankle. This morning  pain worsened, making it difficult to ambulate.  Also noted swelling to right lateral ankle with dec rom.   Denies numbness, fever, rash, other complaint.

## 2025-01-27 NOTE — ED ADULT NURSE NOTE - OBJECTIVE STATEMENT
Pt came in ED c/o right ankle, right mid foot pain. Patient said she noticed it when she woke up last night and heard a crack sound while going to the bathroom. Patient reports worsening pain this morning and difficulty in ambulation.

## 2025-01-27 NOTE — ED PROVIDER NOTE - PHYSICAL EXAMINATION
Gen: NAD, AOx3, able to make needs known, non-toxic  Head: NCAT  HEENT: EOMI, oral mucosa moist, normal conjunctiva  Lung: CTAB, no respiratory distress, no wheezes/rhonchi/rales B/L, speaking in full sentences  CV: RRR, no murmurs  Abd: non distended, soft, nontender, no guarding, no CVA tenderness  MSK:  +plantarflexion and dec dorsiflexion,    Lateral mal swelling, no point bony tenderness.  DPP intact  Neuro: Appears non focal  Skin: Warm, well perfused, no rash  Psych: normal affect

## 2025-01-27 NOTE — ED ADULT NURSE NOTE - NSFALLHARMRISKINTERV_ED_ALL_ED
Assistance OOB with selected safe patient handling equipment if applicable/Communicate risk of Fall with Harm to all staff, patient, and family/Monitor gait and stability/Provide patient with walking aids/Provide visual cue: red socks, yellow wristband, yellow gown, etc/Reinforce activity limits and safety measures with patient and family/Bed in lowest position, wheels locked, appropriate side rails in place/Call bell, personal items and telephone in reach/Instruct patient to call for assistance before getting out of bed/chair/stretcher/Non-slip footwear applied when patient is off stretcher/[place] to call system/Physically safe environment - no spills, clutter or unnecessary equipment/Purposeful Proactive Rounding/Room/bathroom lighting operational, light cord in reach

## 2025-01-27 NOTE — ED PROVIDER NOTE - NSFOLLOWUPINSTRUCTIONS_ED_ALL_ED_FT
you are seen in the emergency department for right ankle pain.  Your x-rays did not reveal any fractures.   if there is a discrepancy in the read you will receive a phone call from us.  You may take activity 5 mg of Tylenol and/or 600 mg of Motrin every 6-8 hours as needed for pain     remember to rest, ice, compress and elevate your ankle you are seen in the emergency department for right ankle pain.  There appears to be a small avulsion fracture of your distal tibia.   if there is a discrepancy in the read you will receive a phone call from us.  You may take activity 975 mg of Tylenol and/or 600 mg of Motrin every 6-8 hours as needed for pain.  continue to wear your splint as directed and do not take off until you see the podiatrist.      Return immediately to the emergency department if you notice numbness, worsening pain,, skin color changes for her right lower extremity or other concerning symptom      Cast or Splint Care, Adult  Casts and splints are supports that are worn to protect broken bones and other injuries. A cast or splint may hold a bone still and in the correct position while it heals. Casts and splints may also help with pain, swelling, and muscle spasms.    A cast is a hardened support that is usually made of fiberglass or plaster. It is custom-fit to the body and offers more protection than a splint. Most casts cannot be taken off and put back on. A splint is a type of soft support that is usually made from cloth and elastic. It can be adjusted or taken off as needed. Often, when a bone is broken, a splint is put on until the swelling goes down, and then the splint is replaced by a cast.    You may need a cast or a splint if you:  Have a broken bone.  Have a soft-tissue injury.  Need to keep an injured body part from moving (keep it immobile) after surgery.  What are the risks?  In some cases, wearing a cast or splint can cause a reduced blood supply to the wrist or hand or to the foot and toes. This can happen if there is a lot of swelling or if the cast or splint is too tight. Limited blood supply results in a condition called compartment syndrome and can cause permanent damage. Symptoms include:  Pain that is getting worse.  Numbness and tingling.  Changes in skin color, including paleness or a bluish color.  Cold fingers or toes.  Other complications of wearing a cast or splint can include:  Skin irritation that can cause itching, rash, skin sores, or skin infection.  Limb stiffness or weakness.  How to care for a nonremovable cast or splint  A person checking the skin around a cast on his foot and lower leg.  Do not put pressure on any part of the cast or splint until it is fully hardened. This may take several hours.  Check the skin around the cast or splint every day. Tell your health care provider about any concerns.  Do not stick anything inside the cast or splint to scratch your skin. Doing that increases your risk of infection.  You may put lotion on dry skin around the edges of the cast or splint. Do not put lotion on the skin underneath the cast or splint.  Keep the cast or splint clean and dry.  How to care for a removable splint  Wear the splint as told by your health care provider. Remove it only as told by your health care provider.  Check the skin around the splint every day. Tell your health care provider about any concerns.  Loosen the splint if your fingers tingle, become numb, or turn cold and blue.  Keep the splint clean and dry. Clean your splint as told by your health care provider. Use mild soap and water and let it air-dry. Do not use heat on your splint.  Follow these instructions at home:  Bathing    Do not take baths, swim, or use a hot tub until your health care provider approves. Ask your health care provider if you may take showers. You may only be allowed to take sponge baths.  If your cast or splint is not waterproof:  Do not let it get wet.  Cover it with a watertight covering when you take a bath or shower.  Managing pain, stiffness, and swelling    A person resting with her lower leg elevated.   If directed, put ice on the affected area. To do this:  If you have a removable cast or splint, remove it as told by your health care provider.  Put ice in a plastic bag.  Place a towel between your skin and the bag or between your cast and the bag.  Leave the ice on for 20 minutes, 2–3 times a day.  Remove the ice if your skin turns bright red. This is very important. If you cannot feel pain, heat, or cold, you have a greater risk of damage to the area.  Move your fingers or toes often to reduce stiffness and swelling.  Raise (elevate) the injured area above the level of your heart while you are sitting or lying down.  Safety    Do not use the injured limb to support your body weight until your health care provider says that you can. Use crutches or other assistive devices as told by your health care provider.  Ask your health care provider when it is safe to drive if you have a cast or splint on part of your body.  General instructions    Take over-the-counter and prescription medicines only as told by your health care provider.  Return to your normal activities as told by your health care provider. Ask your health care provider what activities are safe for you.  Keep all follow-up visits. This is important.  Contact a health care provider if:  The skin around the cast or splint gets red or raw.  The skin under the cast is extremely itchy or painful.  Your cast or splint:  Gets damaged.  Feels very uncomfortable.  Is too tight or too loose.  Your cast becomes wet or develops a soft spot or area.  You notice a bad smell coming from under your cast.  You get an object stuck under your cast.  There is fluid leaking through the cast.  Get help right away if:  You develop any symptoms of compartment syndrome, such as:  Severe pain or pressure under the cast.  Numbness, tingling, coldness, or pale or bluish skin.  The part of your body above or below the cast is swollen and discolored.  You cannot feel or move your fingers or toes.  You have trouble breathing or shortness of breath.  You have chest pain.  Your pain gets worse.  These symptoms may represent a serious problem that is an emergency. Do not wait to see if the symptoms will go away. Get medical help right away. Call your local emergency services (911 in the [place] Do not drive yourself to the hospital.    Summary  Casts and splints are worn to protect broken bones and other injuries.  Casts and splints should remain clean and dry.  Remove your cast or splint only as told by your health care provider.  Get help right away if your pain gets worse, or if you have numbness, tingling, or skin that turns cold, blue, or discolored.  This information is not intended to replace advice given to you by your health care provider. Make sure you discuss any questions you have with your health care provider.

## 2025-01-27 NOTE — ED PROVIDER NOTE - CLINICAL SUMMARY MEDICAL DECISION MAKING FREE TEXT BOX
29-year-old female presenting to emergency department with right ankle pain and swelling since last night.  PE as above differential diagnosis including not limited to fracture, sprain versus strain plan for x-ray, analgesia, podiatry follow-up

## 2025-01-27 NOTE — ED PROVIDER NOTE - ATTENDING APP SHARED VISIT CONTRIBUTION OF CARE
Agree with NP H&P.  29-year-old female presenting to emergency department with right ankle pain and swelling since last night.   concern for possible fracture.  Will get x-ray, offer analgesia, reassess

## 2025-01-27 NOTE — ED PROVIDER NOTE - PATIENT PORTAL LINK FT
You can access the FollowMyHealth Patient Portal offered by [facility] by registering at the following website: [email or web address]. By joining [employer]’s FollowMyHealth portal, you will also be able to view your health information using other applications (apps) compatible with our system.

## 2025-01-27 NOTE — ED PROVIDER NOTE - NSFOLLOWUPCLINICS_GEN_ALL_ED_FT
[facility]  Podiatry/Wound Care  [address] [place] [address]  Phone: [phone]  Fax: [phone]

## 2025-03-05 NOTE — PATIENT PROFILE ADULT. - HAS THE PATIENT HAD A SIGNIFICANT CHANGE IN FUNCTIONAL STATUS DUE TO CVA, HEAD TRAUMA, ORTHOPEDIC TRAUMA/SURGERY, OR FALL, WITH THE WEEK PRIOR TO ADMISSION
Problem: Fall/Injury  Goal: Not fall by end of shift  3/5/2025 1004 by Stan Bates RN  Outcome: Progressing  3/5/2025 1004 by Stan Bates RN  Outcome: Progressing  Goal: Be free from injury by end of the shift  3/5/2025 1004 by Stan Bates RN  Outcome: Progressing  3/5/2025 1004 by Stan Bates RN  Outcome: Progressing  Goal: Verbalize understanding of personal risk factors for fall in the hospital  3/5/2025 1004 by Stan Bates RN  Outcome: Progressing  3/5/2025 1004 by Stan Bates RN  Outcome: Progressing  Goal: Verbalize understanding of risk factor reduction measures to prevent injury from fall in the home  3/5/2025 1004 by Stan Bates RN  Outcome: Progressing  3/5/2025 1004 by Stan Bates RN  Outcome: Progressing  Goal: Use assistive devices by end of the shift  3/5/2025 1004 by Stan Bates RN  Outcome: Progressing  3/5/2025 1004 by Stan Bates RN  Outcome: Progressing  Goal: Pace activities to prevent fatigue by end of the shift  3/5/2025 1004 by Stan Bates RN  Outcome: Progressing  3/5/2025 1004 by Stan Bates RN  Outcome: Progressing     Problem: Pain - Adult  Goal: Verbalizes/displays adequate comfort level or baseline comfort level  3/5/2025 1004 by Stan Bates RN  Outcome: Progressing  3/5/2025 1004 by Stan Bates RN  Outcome: Progressing     Problem: Safety - Adult  Goal: Free from fall injury  Outcome: Progressing     Problem: Discharge Planning  Goal: Discharge to home or other facility with appropriate resources  Outcome: Progressing     Problem: Chronic Conditions and Co-morbidities  Goal: Patient's chronic conditions and co-morbidity symptoms are monitored and maintained or improved  Outcome: Progressing     Problem: Nutrition  Goal: Nutrient intake appropriate for maintaining nutritional needs  Outcome: Progressing      Problem: Pain  Goal: Takes deep breaths with improved pain control throughout the shift  Outcome: Progressing  Goal: Turns in bed with improved pain control throughout the shift  Outcome: Progressing  Goal: Walks with improved pain control throughout the shift  Outcome: Progressing  Goal: Performs ADL's with improved pain control throughout shift  Outcome: Progressing  Goal: Participates in PT with improved pain control throughout the shift  Outcome: Progressing  Goal: Free from opioid side effects throughout the shift  Outcome: Progressing  Goal: Free from acute confusion related to pain meds throughout the shift  Outcome: Progressing     Problem: Skin  Goal: Participates in plan/prevention/treatment measures  Outcome: Progressing  Goal: Prevent/manage excess moisture  Outcome: Progressing  Goal: Prevent/minimize sheer/friction injuries  Outcome: Progressing  Goal: Promote/optimize nutrition  Outcome: Progressing   The patient's goals for the shift include      The clinical goals for the shift include pt will remain injury free         no

## 2025-05-09 NOTE — ED PROVIDER NOTE - CARE PLAN
The patient is a 26y year old Female complaining of vaginal bleeding. Principal Discharge DX:	Acute otitis externa of right ear, unspecified type

## 2025-07-21 NOTE — ED ADULT NURSE NOTE - RESPIRATORY WDL
Additional Note: Patient to observe site for changes. Discussed the importance of monthly self skin checks and routine skin exams. Discussed the importance of daily sun protection- SPF 35 or higher.
Hide Include Location In Plan Question?: No
Detail Level: Zone
Breathing spontaneous and unlabored. Breath sounds clear and equal bilaterally with regular rhythm.